# Patient Record
Sex: FEMALE | Race: WHITE | NOT HISPANIC OR LATINO | Employment: UNEMPLOYED | URBAN - METROPOLITAN AREA
[De-identification: names, ages, dates, MRNs, and addresses within clinical notes are randomized per-mention and may not be internally consistent; named-entity substitution may affect disease eponyms.]

---

## 2017-01-09 ENCOUNTER — OFFICE VISIT (OUTPATIENT)
Dept: MEDICAL GROUP | Facility: MEDICAL CENTER | Age: 72
End: 2017-01-09
Payer: MEDICARE

## 2017-01-09 VITALS
DIASTOLIC BLOOD PRESSURE: 80 MMHG | RESPIRATION RATE: 16 BRPM | OXYGEN SATURATION: 91 % | HEIGHT: 60 IN | WEIGHT: 151 LBS | HEART RATE: 89 BPM | SYSTOLIC BLOOD PRESSURE: 112 MMHG | TEMPERATURE: 97.7 F | BODY MASS INDEX: 29.64 KG/M2

## 2017-01-09 DIAGNOSIS — E11.9 CONTROLLED TYPE 2 DIABETES MELLITUS WITHOUT COMPLICATION, WITHOUT LONG-TERM CURRENT USE OF INSULIN (HCC): ICD-10-CM

## 2017-01-09 DIAGNOSIS — M54.50 CHRONIC LOW BACK PAIN WITHOUT SCIATICA, UNSPECIFIED BACK PAIN LATERALITY: ICD-10-CM

## 2017-01-09 DIAGNOSIS — I10 ESSENTIAL HYPERTENSION: ICD-10-CM

## 2017-01-09 DIAGNOSIS — Z00.00 HEALTH CARE MAINTENANCE: ICD-10-CM

## 2017-01-09 DIAGNOSIS — E78.5 HYPERLIPIDEMIA, UNSPECIFIED HYPERLIPIDEMIA TYPE: ICD-10-CM

## 2017-01-09 DIAGNOSIS — F17.200 TOBACCO DEPENDENCE: ICD-10-CM

## 2017-01-09 DIAGNOSIS — G89.29 CHRONIC LOW BACK PAIN WITHOUT SCIATICA, UNSPECIFIED BACK PAIN LATERALITY: ICD-10-CM

## 2017-01-09 DIAGNOSIS — N39.0 URINARY TRACT INFECTION WITHOUT HEMATURIA, SITE UNSPECIFIED: ICD-10-CM

## 2017-01-09 DIAGNOSIS — E03.9 HYPOTHYROIDISM, UNSPECIFIED TYPE: ICD-10-CM

## 2017-01-09 LAB
APPEARANCE UR: CLEAR
BILIRUB UR STRIP-MCNC: NORMAL MG/DL
COLOR UR AUTO: YELLOW
GLUCOSE UR STRIP.AUTO-MCNC: NORMAL MG/DL
HBA1C MFR BLD: 6.4 % (ref ?–5.8)
INT CON NEG: NORMAL
INT CON POS: NORMAL
KETONES UR STRIP.AUTO-MCNC: NORMAL MG/DL
LEUKOCYTE ESTERASE UR QL STRIP.AUTO: NORMAL
NITRITE UR QL STRIP.AUTO: NORMAL
PH UR STRIP.AUTO: 5 [PH] (ref 5–8)
PROT UR QL STRIP: NORMAL MG/DL
RBC UR QL AUTO: NORMAL
SP GR UR STRIP.AUTO: 1.01
UROBILINOGEN UR STRIP-MCNC: NORMAL MG/DL

## 2017-01-09 PROCEDURE — 83036 HEMOGLOBIN GLYCOSYLATED A1C: CPT | Performed by: FAMILY MEDICINE

## 2017-01-09 PROCEDURE — 90732 PPSV23 VACC 2 YRS+ SUBQ/IM: CPT | Performed by: FAMILY MEDICINE

## 2017-01-09 PROCEDURE — 99204 OFFICE O/P NEW MOD 45 MIN: CPT | Mod: 25 | Performed by: FAMILY MEDICINE

## 2017-01-09 PROCEDURE — 81002 URINALYSIS NONAUTO W/O SCOPE: CPT | Performed by: FAMILY MEDICINE

## 2017-01-09 PROCEDURE — G0009 ADMIN PNEUMOCOCCAL VACCINE: HCPCS | Performed by: FAMILY MEDICINE

## 2017-01-09 ASSESSMENT — PATIENT HEALTH QUESTIONNAIRE - PHQ9: CLINICAL INTERPRETATION OF PHQ2 SCORE: 2

## 2017-01-09 NOTE — PROGRESS NOTES
CC: Establish care    HPI:  Alie presents today to establish a new PCP.     Lives alone, active, and independent. Has the following medical issues:    Type 2 diabetes mellitus , has been adequately controlled. Her A1C today is 6.4.Denies excessive urination, and drinking of water. Has been on metformin 1500 mg daily.No side effects.    Patient stated that she has a recent h/o urinary tract infection without hematuria,just wants to check her urine. Denies any burning micturition or gross blood in the urine. UA is checked today, and was found negative for infection and hematuria.    Hypothyroidism, she has been tolerating Levothyroxine, no palpitation, no cold or heat intolerance, she is on levothyroxine 25 mcg daily.No thyroid functions in records.    Hyperlipidemia, she has been tolerating the statin. Denies muscle pain LFTs has been normal, currently on Crestor 20 mg daily.Last lipid panel done in 8/2016 was ( T chol 156, , HDL 45, LDL 76)    Chronic low back pain, she has been following up with pain specialist Dr Jovany Romero.Has been on narcotics, Lamictal and muscle relaxant. Follows up every month.    Smokes a half a pack to a pack a day. Smoking cessation discussed, she does not want to stop for now. Denies cough and SOB.    Essential hypertension, BP has been adequately controlled on current medication. Denies headache, chest pain, and SOB.She is on HCTZ 25 mg daily. No side effects.    Due for PPSV 23.    Patient Active Problem List    Diagnosis Date Noted   • Tobacco dependence 01/09/2017   • Diabetes mellitus type 2 in nonobese (HCC) 06/19/2015   • Chronic low back pain 06/19/2015   • Hyperlipidemia 06/19/2015   • Hypothyroidism 06/19/2015       Current Outpatient Prescriptions   Medication Sig Dispense Refill   • tizanidine (ZANAFLEX) 4 MG TABS Take 4 mg by mouth every 6 hours as needed.     • hydrochlorothiazide (HYDRODIURIL) 25 MG TABS Take 25 mg by mouth every day.     •  Oxycodone-Acetaminophen (PERCOCET-10)  MG TABS Take 1-2 Tabs by mouth every four hours as needed for Severe Pain.     • Cholecalciferol (VITAMIN D) 400 UNIT TABS Take 400 Units by mouth every day.     • lamotrigine (LAMICTAL) 100 MG TABS Take 100 mg by mouth every day.     • levothyroxine (SYNTHROID) 25 mcg/mL SUSP Take  by mouth Every morning on an empty stomach.     • metformin (GLUCOPHAGE) 500 MG TABS Take 500 mg by mouth 2 times a day, with meals.     • rosuvastatin (CRESTOR) 20 MG TABS Take 20 mg by mouth every evening.     • tizanidine (ZANAFLEX) 2 MG tablet Take 1 Tab by mouth 2 times a day as needed. 30 Tab 0     No current facility-administered medications for this visit.         Allergies as of 01/09/2017   • (No Known Allergies)        Social History     Social History   • Marital Status:      Spouse Name: N/A   • Number of Children: N/A   • Years of Education: N/A     Occupational History   • Not on file.     Social History Main Topics   • Smoking status: Current Some Day Smoker -- 0.50 packs/day for 50 years     Types: Cigarettes   • Smokeless tobacco: Never Used   • Alcohol Use: No   • Drug Use: No   • Sexual Activity: Not on file     Other Topics Concern   • Not on file     Social History Narrative   • No narrative on file       History reviewed. No pertinent family history.    Past Surgical History   Procedure Laterality Date   • Abdominal hysterectomy total     • Athroplasty     • Cholecystectomy         ROS:  Denies any Headache, Blurred Vision, Confusion Chest pain,  Shortness of breath,  Abdominal pain, Changes of bowel or bladder, Lower ext edema, Fevers, Nights sweats, Weight Changes, Focal weakness or numbness.  All other systems are negative.    /80 mmHg  Pulse 89  Temp(Src) 36.5 °C (97.7 °F)  Resp 16  Ht 1.524 m (5')  Wt 68.493 kg (151 lb)  BMI 29.49 kg/m2  SpO2 91%    Physical Exam:  Gen:         Alert and oriented, No apparent distress.  HEENT:   [place]  clear,  Oralpharynx no erythema or exudates.  Neck:       No Jugular venous distension, Lymphadenopathy, Thyromegaly, Bruits.  Lungs:     Clear to auscultation bilaterally  CV:          Regular rate and rhythm. No murmurs, rubs or gallops.  Abd:         Soft non tender, non distended. Normal active bowel sounds. No hepatosplenomegaly, No pulsatile masses.  Ext:          No clubbing, cyanosis, edema.      Assessment and Plan.   71 y.o. female     1. Controlled type 2 diabetes mellitus without complication, without long-term current use of insulin (HCC)  Adequately controlled. A1C today is 6.4.  Continue on metformin 1500 mg daily.    - POCT Hemoglobin A1c    2. H/O Urinary tract infection without hematuria, site unspecified  UA is negative for infection and hematuria.    - POCT Urinalysis    3. Hypothyroidism, unspecified type  He has been tolerating Levothyroxine, no palpitation, no cold or heat intolerance  Continue on levothyroxine 25 mcg daily.    - TSH+FREE T4    4. Hyperlipidemia, unspecified hyperlipidemia type  He has been tolerating the statin. Denies muscle pain LFTs has been normal  Continue on Crestor 20 mg daily.    5. Chronic low back pain without sciatica, unspecified back pain laterality  Has been following up with pain specialist Dr Jovany Romero.Has been on narcotics    6. Health care maintenance  Due for PPSV 23.    - PNEUMOCOCCAL POLYSACCHARIDE VACCINE 23-VALENT =>1YO SQ/IM    7. Tobacco dependence  Smokes a half a pack to a pack a day. Smoking cessation discussed, she does not want to stop for now. Denies cough and SOB.    8. Essential hypertension  Has been adequately controlled on current medication. Denies headache, chest pain, and SOB.  Continue on HCTZ 25 mg daily

## 2017-01-09 NOTE — MR AVS SNAPSHOT
Alie Morris   2017 3:40 PM   Office Visit   MRN: [id number]    Department:  [address] [place] [address]   Dept Phone:  [phone]    Description:  Female : 1945   Provider:  Arminda Chin M.D.           Reason for Visit     New Patient establish      Allergies as of 2017     No Known Allergies      You were diagnosed with     Controlled type 2 diabetes mellitus without complication, without long-term current use of insulin (HCC)   [7537092]       Urinary tract infection without hematuria, site unspecified   [0402258]       Hypothyroidism, unspecified type   [1635117]       Hyperlipidemia, unspecified hyperlipidemia type   [8663936]       Chronic low back pain without sciatica, unspecified back pain laterality   [2257993]       Health care maintenance   [456405]       Tobacco dependence   [647680]       Essential hypertension   [7820269]         Vital Signs     Blood Pressure Pulse Temperature Respirations Height Weight    112/80 mmHg 89 36.5 °C (97.7 °F) 16 1.524 m (5') 68.493 kg (151 lb)    Body Mass Index Oxygen Saturation Smoking Status             29.49 kg/m2 91% Current Some Day Smoker         Basic Information     Date Of Birth Sex Race Ethnicity Preferred Language    1945 Female White Non- English      Your appointments     2017  3:40 PM   Established Patient with Arminda Chin M.D.   [address] ([address])    [address] [place] [address] [address] [place] [id number]   [phone]           You will be receiving a confirmation call a few days before your appointment from our automated call confirmation system.              Problem List              ICD-10-CM Priority Class Noted - Resolved    Diabetes mellitus type 2 in nonobese (HCC) E11.9   2015 - Present    Chronic low back pain M54.5, G89.29   2015 - Present    Hyperlipidemia E78.5   2015 - Present    Hypothyroidism E03.9   2015 - Present    Tobacco dependence  F17.200   1/9/2017 - Present      Health Maintenance        Date Due Completion Dates    DIABETES MONOFILAMTENT / LE EXAM 1945 ---    RETINAL SCREENING 3/25/1963 ---    URINE ACR / MICROALBUMIN 3/25/1963 ---    IMM DTaP/Tdap/Td Vaccine (1 - Tdap) 3/25/1964 ---    PAP SMEAR 3/25/1966 ---    MAMMOGRAM 3/25/1985 ---    COLONOSCOPY 3/25/1995 ---    IMM ZOSTER VACCINE 3/25/2005 ---    BONE DENSITY 3/25/2010 ---    IMM PNEUMOCOCCAL 65+ (ADULT) LOW/MEDIUM RISK SERIES (1 of 2 - PCV13) 3/25/2010 ---    IMM INFLUENZA (1) 9/1/2016 ---    A1C SCREENING 7/9/2017 1/9/2017, 8/18/2016    FASTING LIPID PROFILE 8/18/2017 8/18/2016    SERUM CREATININE 8/18/2017 8/18/2016            Results     POCT Hemoglobin A1c      Component    Glycohemoglobin    6.4    Internal Control Negative    Internal Control Positive                POCT Urinalysis      Component Value Standard Range & Units    POC Color yellow Negative    POC Appearance clear Negative    POC Leukocyte Esterase neg Negative    POC Nitrites pink Negative    POC Urobiligen neg Negative (0.2) mg/dL    POC Protein neg Negative mg/dL    POC Urine PH 5.0 5.0 - 8.0    POC Blood neg Negative    POC Specific Gravity 1.010 <1.005 - >1.030    POC Ketones neg Negative mg/dL    POC Biliruben neg Negative mg/dL    POC Glucose neg Negative mg/dL                        Current Immunizations     Pneumococcal polysaccharide vaccine (PPSV-23)  Incomplete      Below and/or attached are the medications your provider expects you to take. Review all of your home medications and newly ordered medications with your provider and/or pharmacist. Follow medication instructions as directed by your provider and/or pharmacist. Please keep your medication list with you and share with your provider. Update the information when medications are discontinued, doses are changed, or new medications (including over-the-counter products) are added; and carry medication information at all times in the event of  emergency situations     Allergies:  No Known Allergies          Medications  Valid as of: January 09, 2017 -  3:55 PM    Generic Name Brand Name Tablet Size Instructions for use    Cholecalciferol (Tab) VITAMIN D 400 UNIT Take 400 Units by mouth every day.        HydroCHLOROthiazide (Tab) HYDRODIURIL 25 MG Take 25 mg by mouth every day.        LamoTRIgine (Tab) LAMICTAL 100 MG Take 100 mg by mouth every day.        levothyroxine (SYNTHROID) 25 mcg/mL SUSP (Suspension) SYNTHROID 25 mcg/mL Take  by mouth Every morning on an empty stomach.        MetFORMIN HCl (Tab) GLUCOPHAGE 500 MG Take 500 mg by mouth 2 times a day, with meals.        Oxycodone-Acetaminophen (Tab) PERCOCET-10  MG Take 1-2 Tabs by mouth every four hours as needed for Severe Pain.        Rosuvastatin Calcium (Tab) CRESTOR 20 MG Take 20 mg by mouth every evening.        TiZANidine HCl (Tab) ZANAFLEX 4 MG Take 4 mg by mouth every 6 hours as needed.        TiZANidine HCl (Tab) ZANAFLEX 2 MG Take 1 Tab by mouth 2 times a day as needed.        .                 Medicines prescribed today were sent to:     [facility] [place] - [address] [facility]    [address] [facility] [address]    Phone: [phone] Fax: [phone]    Open 24 Hours?: No      Medication refill instructions:       If your prescription bottle indicates you have medication refills left, it is not necessary to call your provider’s office. Please contact your pharmacy and they will refill your medication.    If your prescription bottle indicates you do not have any refills left, you may request refills at any time through one of the following ways: The online [employer] system (except Urgent Care), by calling your provider’s office, or by asking your pharmacy to contact your provider’s office with a refill request. Medication refills are processed only during regular business hours and may not be available until the next business day.  Your provider may request additional information or to have a follow-up visit with you prior to refilling your medication.   *Please Note: Medication refills are assigned a new Rx number when refilled electronically. Your pharmacy may indicate that no refills were authorized even though a new prescription for the same medication is available at the pharmacy. Please request the medicine by name with the pharmacy before contacting your provider for a refill.        Instructions      Diabetes Education    • Take your Diabetes medicine as directed, as well as all other prescribed medication, even if you feel good. Tell your provider if you cannot afford your medications or if you are experiencing any side effects.  • Keep simple sugars or glucose tabs with you at all times in the event of low blood sugar. Carry or wear a medical alert card or bracelet/necklace with your diabetic information.   • Check your blood sugar levels daily and according to your providers' recommendations. Keep a log of your blood sugar levels and bring with you to all your appointments. Follow the correctional scale prescribed by your provider, if you were instructed to use rapid acting insulin before meals and before bed. Be sure to account for the carbs in your meal as directed by your provider. Rapid acting insulin: Humulog (lispro), Novolog (aspart), Apidra (glulisine).  • Schedule and keep follow up appointments as indicated by your provider for: diabetes follow ups (bring your glucose meter and blood sugar log), annual dilated eye exam with a qualified ophthalmologist/optometrist, lab tests as requested by your provider (includes blood work and urine test) and semi-annual dental exam.  • Check your feet using a hand held mirror every day for cuts, cracks, sores, bumps, and or red spots. Avoid walking on bare feet. Only use clippers for toenails. See a podiatrist for callous care. Call or schedule an appointment with your provider if you  notice sores that are not healing.  • Eat a small meal or healthy snack every 3 - 4 hours. Don't skip meals. Include protein with carbohydrate at most meals. Drink water, plain or flavored with fresh fruit, instead of juice or soda. An ideal plate is made up of half non-starchy vegetables, one quarter protein and one quarter starch.  • Stay or get to a healthy weight by using your meal plan and moving more. Set a goal to be more active most days of the week. Start slow by taking 10 minute walks 3 times a day. Take the stairs instead of an elevator or escalator. Twice a week, work to increase your muscle strength. Use stretch bands, do yoga, heavy gardening (digging and planting with tools), or use dumb bells to strengthen your arms.  • When to call your Endocrinologist or Primary Care Provider   -If your blood glucose stays over 300 mg/dL for 24 hours    -If you are having frequent low blood sugars    -If you experience any one or all of the following symptoms: blurry    vision, fast breathing, weakness, dizziness or shakiness,     headache, rapid heartbeat, confusion, or irritability.                     [employer] Access Code: [email or web address]  Expires: 1/28/2017 11:19 AM    Epiphany  A secure, online tool to manage your health information     [employer] Epiphany® is a secure, online tool that connects you to your personalized health information from the privacy of your home -- day or night - making it very easy for you to manage your healthcare. Once the activation process is completed, you can even access your medical information using the Epiphany vitaliy, which is available for free in the Apple Vitaliy store or Google Play store.     Epiphany provides the following levels of access (as shown below):   My Chart Features   [facility] Doctor [facility]  Specialists [facility]  Urgent  Care Non-[facility]  Primary Care  Doctor   Email your healthcare team securely and privately 24/7 X X X    Manage appointments: schedule  your next appointment; view details of past/upcoming appointments X      Request prescription refills. X      View recent personal medical records, including lab and immunizations X X X X   View health record, including health history, allergies, medications X X X X   Read reports about your outpatient visits, procedures, consult and ER notes X X X X   See your discharge summary, which is a recap of your hospital and/or ER visit that includes your diagnosis, lab results, and care plan. X X       How to register for [employer]:  1. Go to  [email or web address].  2. Click on the Sign Up Now box, which takes you to the New Member Sign Up page. You will need to provide the following information:  a. Enter your [employer] Access Code exactly as it appears at the top of this page. (You will not need to use this code after you’ve completed the sign-up process. If you do not sign up before the expiration date, you must request a new code.)   b. Enter your date of birth.   c. Enter your home email address.   d. Click Submit, and follow the next screen’s instructions.  3. Create a [employer] ID. This will be your [employer] login ID and cannot be changed, so think of one that is secure and easy to remember.  4. Create a [employer] password. You can change your password at any time.  5. Enter your Password Reset Question and Answer. This can be used at a later time if you forget your password.   6. Enter your e-mail address. This allows you to receive e-mail notifications when new information is available in [employer].  7. Click Sign Up. You can now view your health information.    For assistance activating your [employer] account, call [phone]

## 2017-04-11 ENCOUNTER — APPOINTMENT (OUTPATIENT)
Dept: MEDICAL GROUP | Facility: MEDICAL CENTER | Age: 72
End: 2017-04-11
Payer: MEDICAID

## 2017-04-12 RX ORDER — LAMOTRIGINE 100 MG/1
100 TABLET ORAL DAILY
Qty: 90 TAB | Refills: 1 | Status: SHIPPED | OUTPATIENT
Start: 2017-04-12 | End: 2017-10-12 | Stop reason: SDUPTHER

## 2017-04-12 RX ORDER — ROSUVASTATIN CALCIUM 20 MG/1
20 TABLET, COATED ORAL EVERY EVENING
Qty: 90 TAB | Refills: 1 | Status: SHIPPED | OUTPATIENT
Start: 2017-04-12 | End: 2017-09-28 | Stop reason: SDUPTHER

## 2017-04-12 RX ORDER — HYDROCHLOROTHIAZIDE 25 MG/1
25 TABLET ORAL DAILY
Qty: 90 TAB | Refills: 1 | Status: SHIPPED | OUTPATIENT
Start: 2017-04-12 | End: 2017-05-25

## 2017-04-12 RX ORDER — TIZANIDINE 4 MG/1
4 TABLET ORAL EVERY 6 HOURS PRN
Qty: 30 TAB | Refills: 2 | Status: SHIPPED | OUTPATIENT
Start: 2017-04-12 | End: 2017-04-15

## 2017-04-12 NOTE — TELEPHONE ENCOUNTER
Patient rescheduled her appt for 4/18 she missed her appt. Yesterday due to her flight not leaving on time. She is out of all her medications

## 2017-04-15 ENCOUNTER — APPOINTMENT (OUTPATIENT)
Dept: RADIOLOGY | Facility: MEDICAL CENTER | Age: 72
DRG: 871 | End: 2017-04-15
Attending: EMERGENCY MEDICINE
Payer: MEDICARE

## 2017-04-15 ENCOUNTER — APPOINTMENT (OUTPATIENT)
Dept: RADIOLOGY | Facility: MEDICAL CENTER | Age: 72
DRG: 871 | End: 2017-04-15
Attending: HOSPITALIST
Payer: MEDICARE

## 2017-04-15 ENCOUNTER — RESOLUTE PROFESSIONAL BILLING HOSPITAL PROF FEE (OUTPATIENT)
Dept: HOSPITALIST | Facility: MEDICAL CENTER | Age: 72
End: 2017-04-15
Payer: MEDICARE

## 2017-04-15 ENCOUNTER — HOSPITAL ENCOUNTER (INPATIENT)
Facility: MEDICAL CENTER | Age: 72
LOS: 4 days | DRG: 871 | End: 2017-04-19
Attending: EMERGENCY MEDICINE | Admitting: INTERNAL MEDICINE
Payer: MEDICARE

## 2017-04-15 DIAGNOSIS — A41.9 SEPSIS, DUE TO UNSPECIFIED ORGANISM: ICD-10-CM

## 2017-04-15 DIAGNOSIS — T40.604A NARCOTIC OVERDOSE, UNDETERMINED INTENT, INITIAL ENCOUNTER (HCC): ICD-10-CM

## 2017-04-15 DIAGNOSIS — N39.0 URINARY TRACT INFECTION, SITE UNSPECIFIED: ICD-10-CM

## 2017-04-15 DIAGNOSIS — M54.50 MIDLINE LOW BACK PAIN WITHOUT SCIATICA, UNSPECIFIED CHRONICITY: ICD-10-CM

## 2017-04-15 PROBLEM — R65.21 SEPTIC SHOCK (HCC): Status: ACTIVE | Noted: 2017-04-15

## 2017-04-15 LAB
ALBUMIN SERPL BCP-MCNC: 3.8 G/DL (ref 3.2–4.9)
ALBUMIN/GLOB SERPL: 1.7 G/DL
ALP SERPL-CCNC: 66 U/L (ref 30–99)
ALT SERPL-CCNC: 120 U/L (ref 2–50)
AMPHET UR QL SCN: NEGATIVE
ANION GAP SERPL CALC-SCNC: 14 MMOL/L (ref 0–11.9)
APPEARANCE UR: ABNORMAL
AST SERPL-CCNC: 244 U/L (ref 12–45)
BACTERIA #/AREA URNS HPF: ABNORMAL /HPF
BARBITURATES UR QL SCN: NEGATIVE
BASOPHILS # BLD AUTO: 0.2 % (ref 0–1.8)
BASOPHILS # BLD: 0.02 K/UL (ref 0–0.12)
BENZODIAZ UR QL SCN: NEGATIVE
BILIRUB SERPL-MCNC: 0.5 MG/DL (ref 0.1–1.5)
BILIRUB UR QL STRIP.AUTO: NEGATIVE
BUN SERPL-MCNC: 44 MG/DL (ref 8–22)
BZE UR QL SCN: NEGATIVE
CALCIUM SERPL-MCNC: 8.5 MG/DL (ref 8.5–10.5)
CANNABINOIDS UR QL SCN: NEGATIVE
CHLORIDE SERPL-SCNC: 94 MMOL/L (ref 96–112)
CO2 SERPL-SCNC: 20 MMOL/L (ref 20–33)
COLOR UR: YELLOW
CORTIS SERPL-MCNC: 16.8 UG/DL (ref 0–23)
CREAT SERPL-MCNC: 2.78 MG/DL (ref 0.5–1.4)
EKG IMPRESSION: NORMAL
EOSINOPHIL # BLD AUTO: 0.16 K/UL (ref 0–0.51)
EOSINOPHIL NFR BLD: 1.9 % (ref 0–6.9)
EPI CELLS #/AREA URNS HPF: ABNORMAL /HPF
ERYTHROCYTE [DISTWIDTH] IN BLOOD BY AUTOMATED COUNT: 53.5 FL (ref 35.9–50)
GFR SERPL CREATININE-BSD FRML MDRD: 17 ML/MIN/1.73 M 2
GLOBULIN SER CALC-MCNC: 2.2 G/DL (ref 1.9–3.5)
GLUCOSE BLD-MCNC: 102 MG/DL (ref 65–99)
GLUCOSE BLD-MCNC: 123 MG/DL (ref 65–99)
GLUCOSE SERPL-MCNC: 83 MG/DL (ref 65–99)
GLUCOSE UR STRIP.AUTO-MCNC: NEGATIVE MG/DL
HCT VFR BLD AUTO: 34.2 % (ref 37–47)
HGB BLD-MCNC: 10.7 G/DL (ref 12–16)
IMM GRANULOCYTES # BLD AUTO: 0.03 K/UL (ref 0–0.11)
IMM GRANULOCYTES NFR BLD AUTO: 0.4 % (ref 0–0.9)
KETONES UR STRIP.AUTO-MCNC: NEGATIVE MG/DL
LACTATE BLD-SCNC: 1.1 MMOL/L (ref 0.5–2)
LACTATE BLD-SCNC: 1.9 MMOL/L (ref 0.5–2)
LACTATE BLD-SCNC: 6.3 MMOL/L (ref 0.5–2)
LEUKOCYTE ESTERASE UR QL STRIP.AUTO: ABNORMAL
LYMPHOCYTES # BLD AUTO: 1.67 K/UL (ref 1–4.8)
LYMPHOCYTES NFR BLD: 20.3 % (ref 22–41)
MCH RBC QN AUTO: 33.3 PG (ref 27–33)
MCHC RBC AUTO-ENTMCNC: 31.3 G/DL (ref 33.6–35)
MCV RBC AUTO: 106.5 FL (ref 81.4–97.8)
MDMA UR QL SCN: NEGATIVE
METHADONE UR QL SCN: NEGATIVE
MICRO URNS: ABNORMAL
MONOCYTES # BLD AUTO: 0.67 K/UL (ref 0–0.85)
MONOCYTES NFR BLD AUTO: 8.1 % (ref 0–13.4)
NEUTROPHILS # BLD AUTO: 5.68 K/UL (ref 2–7.15)
NEUTROPHILS NFR BLD: 69.1 % (ref 44–72)
NITRITE UR QL STRIP.AUTO: NEGATIVE
NRBC # BLD AUTO: 0 K/UL
NRBC BLD AUTO-RTO: 0 /100 WBC
OPIATES UR QL SCN: POSITIVE
OXYCODONE UR QL SCN: POSITIVE
PCP UR QL SCN: NEGATIVE
PH UR STRIP.AUTO: 5.5 [PH]
PLATELET # BLD AUTO: 142 K/UL (ref 164–446)
PMV BLD AUTO: 10.1 FL (ref 9–12.9)
POTASSIUM SERPL-SCNC: 5 MMOL/L (ref 3.6–5.5)
PROPOXYPH UR QL SCN: NEGATIVE
PROT SERPL-MCNC: 6 G/DL (ref 6–8.2)
PROT UR QL STRIP: 100 MG/DL
RBC # BLD AUTO: 3.21 M/UL (ref 4.2–5.4)
RBC # URNS HPF: ABNORMAL /HPF
RBC UR QL AUTO: ABNORMAL
SODIUM SERPL-SCNC: 128 MMOL/L (ref 135–145)
SP GR UR STRIP.AUTO: 1.02
T4 FREE SERPL-MCNC: 0.75 NG/DL (ref 0.53–1.43)
TROPONIN I SERPL-MCNC: 0.01 NG/ML (ref 0–0.04)
TSH SERPL DL<=0.005 MIU/L-ACNC: 9.92 UIU/ML (ref 0.3–3.7)
WBC # BLD AUTO: 8.2 K/UL (ref 4.8–10.8)
WBC #/AREA URNS HPF: >150 /HPF

## 2017-04-15 PROCEDURE — 96365 THER/PROPH/DIAG IV INF INIT: CPT

## 2017-04-15 PROCEDURE — 700111 HCHG RX REV CODE 636 W/ 250 OVERRIDE (IP): Performed by: EMERGENCY MEDICINE

## 2017-04-15 PROCEDURE — 700105 HCHG RX REV CODE 258: Performed by: EMERGENCY MEDICINE

## 2017-04-15 PROCEDURE — 96375 TX/PRO/DX INJ NEW DRUG ADDON: CPT

## 2017-04-15 PROCEDURE — 99223 1ST HOSP IP/OBS HIGH 75: CPT | Mod: 25 | Performed by: INTERNAL MEDICINE

## 2017-04-15 PROCEDURE — 770022 HCHG ROOM/CARE - ICU (200)

## 2017-04-15 PROCEDURE — 303105 HCHG CATHETER EXTRA

## 2017-04-15 PROCEDURE — 700111 HCHG RX REV CODE 636 W/ 250 OVERRIDE (IP)

## 2017-04-15 PROCEDURE — B244ZZZ ULTRASONOGRAPHY OF RIGHT HEART: ICD-10-PCS | Performed by: HOSPITALIST

## 2017-04-15 PROCEDURE — 36556 INSERT NON-TUNNEL CV CATH: CPT | Performed by: HOSPITALIST

## 2017-04-15 PROCEDURE — 36556 INSERT NON-TUNNEL CV CATH: CPT

## 2017-04-15 PROCEDURE — 83605 ASSAY OF LACTIC ACID: CPT | Mod: 91

## 2017-04-15 PROCEDURE — 87077 CULTURE AEROBIC IDENTIFY: CPT

## 2017-04-15 PROCEDURE — 93005 ELECTROCARDIOGRAM TRACING: CPT | Performed by: EMERGENCY MEDICINE

## 2017-04-15 PROCEDURE — 700111 HCHG RX REV CODE 636 W/ 250 OVERRIDE (IP): Performed by: HOSPITALIST

## 2017-04-15 PROCEDURE — 700111 HCHG RX REV CODE 636 W/ 250 OVERRIDE (IP): Performed by: INTERNAL MEDICINE

## 2017-04-15 PROCEDURE — 71010 DX-CHEST-PORTABLE (1 VIEW): CPT

## 2017-04-15 PROCEDURE — 84484 ASSAY OF TROPONIN QUANT: CPT

## 2017-04-15 PROCEDURE — 700105 HCHG RX REV CODE 258

## 2017-04-15 PROCEDURE — 51702 INSERT TEMP BLADDER CATH: CPT

## 2017-04-15 PROCEDURE — 80307 DRUG TEST PRSMV CHEM ANLYZR: CPT

## 2017-04-15 PROCEDURE — 700105 HCHG RX REV CODE 258: Performed by: INTERNAL MEDICINE

## 2017-04-15 PROCEDURE — 82533 TOTAL CORTISOL: CPT

## 2017-04-15 PROCEDURE — 80053 COMPREHEN METABOLIC PANEL: CPT

## 2017-04-15 PROCEDURE — 96372 THER/PROPH/DIAG INJ SC/IM: CPT

## 2017-04-15 PROCEDURE — 700102 HCHG RX REV CODE 250 W/ 637 OVERRIDE(OP): Performed by: INTERNAL MEDICINE

## 2017-04-15 PROCEDURE — 87040 BLOOD CULTURE FOR BACTERIA: CPT | Mod: 91

## 2017-04-15 PROCEDURE — 81001 URINALYSIS AUTO W/SCOPE: CPT

## 2017-04-15 PROCEDURE — 99291 CRITICAL CARE FIRST HOUR: CPT

## 2017-04-15 PROCEDURE — A9270 NON-COVERED ITEM OR SERVICE: HCPCS | Performed by: INTERNAL MEDICINE

## 2017-04-15 PROCEDURE — C1751 CATH, INF, PER/CENT/MIDLINE: HCPCS

## 2017-04-15 PROCEDURE — 76937 US GUIDE VASCULAR ACCESS: CPT

## 2017-04-15 PROCEDURE — 85025 COMPLETE CBC W/AUTO DIFF WBC: CPT

## 2017-04-15 PROCEDURE — 84439 ASSAY OF FREE THYROXINE: CPT

## 2017-04-15 PROCEDURE — 700101 HCHG RX REV CODE 250: Performed by: EMERGENCY MEDICINE

## 2017-04-15 PROCEDURE — 96366 THER/PROPH/DIAG IV INF ADDON: CPT

## 2017-04-15 PROCEDURE — 76775 US EXAM ABDO BACK WALL LIM: CPT

## 2017-04-15 PROCEDURE — 87086 URINE CULTURE/COLONY COUNT: CPT

## 2017-04-15 PROCEDURE — 96361 HYDRATE IV INFUSION ADD-ON: CPT

## 2017-04-15 PROCEDURE — 82962 GLUCOSE BLOOD TEST: CPT | Mod: 91

## 2017-04-15 PROCEDURE — 96368 THER/DIAG CONCURRENT INF: CPT

## 2017-04-15 PROCEDURE — 87186 SC STD MICRODIL/AGAR DIL: CPT

## 2017-04-15 PROCEDURE — 02H633Z INSERTION OF INFUSION DEVICE INTO RIGHT ATRIUM, PERCUTANEOUS APPROACH: ICD-10-PCS | Performed by: HOSPITALIST

## 2017-04-15 PROCEDURE — 84443 ASSAY THYROID STIM HORMONE: CPT

## 2017-04-15 RX ORDER — TIZANIDINE 4 MG/1
4 TABLET ORAL 2 TIMES DAILY
Status: DISCONTINUED | OUTPATIENT
Start: 2017-04-15 | End: 2017-04-20 | Stop reason: HOSPADM

## 2017-04-15 RX ORDER — SODIUM CHLORIDE 9 MG/ML
500 INJECTION, SOLUTION INTRAVENOUS
Status: DISCONTINUED | OUTPATIENT
Start: 2017-04-15 | End: 2017-04-16

## 2017-04-15 RX ORDER — LEVOTHYROXINE SODIUM 0.03 MG/1
25 TABLET ORAL DAILY
Status: ON HOLD | COMMUNITY
End: 2017-04-17

## 2017-04-15 RX ORDER — NOREPINEPHRINE BITARTRATE 1 MG/ML
INJECTION, SOLUTION INTRAVENOUS
Status: DISCONTINUED
Start: 2017-04-15 | End: 2017-04-15

## 2017-04-15 RX ORDER — SODIUM CHLORIDE 9 MG/ML
30 INJECTION, SOLUTION INTRAVENOUS ONCE
Status: COMPLETED | OUTPATIENT
Start: 2017-04-15 | End: 2017-04-15

## 2017-04-15 RX ORDER — ONDANSETRON 2 MG/ML
4 INJECTION INTRAMUSCULAR; INTRAVENOUS EVERY 4 HOURS PRN
Status: DISCONTINUED | OUTPATIENT
Start: 2017-04-15 | End: 2017-04-20 | Stop reason: HOSPADM

## 2017-04-15 RX ORDER — FAMOTIDINE 20 MG/1
20 TABLET, FILM COATED ORAL DAILY
Status: DISCONTINUED | OUTPATIENT
Start: 2017-04-15 | End: 2017-04-17

## 2017-04-15 RX ORDER — NALOXONE HYDROCHLORIDE 0.4 MG/ML
INJECTION, SOLUTION INTRAMUSCULAR; INTRAVENOUS; SUBCUTANEOUS
Status: COMPLETED
Start: 2017-04-15 | End: 2017-04-15

## 2017-04-15 RX ORDER — BISACODYL 10 MG
10 SUPPOSITORY, RECTAL RECTAL
Status: DISCONTINUED | OUTPATIENT
Start: 2017-04-15 | End: 2017-04-20 | Stop reason: HOSPADM

## 2017-04-15 RX ORDER — SODIUM CHLORIDE 9 MG/ML
2000 INJECTION, SOLUTION INTRAVENOUS ONCE
Status: COMPLETED | OUTPATIENT
Start: 2017-04-15 | End: 2017-04-15

## 2017-04-15 RX ORDER — LAMOTRIGINE 100 MG/1
100 TABLET ORAL DAILY
Status: DISCONTINUED | OUTPATIENT
Start: 2017-04-15 | End: 2017-04-20 | Stop reason: HOSPADM

## 2017-04-15 RX ORDER — SODIUM CHLORIDE 9 MG/ML
INJECTION, SOLUTION INTRAVENOUS CONTINUOUS
Status: DISCONTINUED | OUTPATIENT
Start: 2017-04-15 | End: 2017-04-17

## 2017-04-15 RX ORDER — ROSUVASTATIN CALCIUM 10 MG/1
20 TABLET, COATED ORAL DAILY
Status: DISCONTINUED | OUTPATIENT
Start: 2017-04-16 | End: 2017-04-20 | Stop reason: HOSPADM

## 2017-04-15 RX ORDER — HEPARIN SODIUM 5000 [USP'U]/ML
5000 INJECTION, SOLUTION INTRAVENOUS; SUBCUTANEOUS EVERY 8 HOURS
Status: DISCONTINUED | OUTPATIENT
Start: 2017-04-15 | End: 2017-04-20 | Stop reason: HOSPADM

## 2017-04-15 RX ORDER — POLYETHYLENE GLYCOL 3350 17 G/17G
1 POWDER, FOR SOLUTION ORAL
Status: DISCONTINUED | OUTPATIENT
Start: 2017-04-15 | End: 2017-04-20 | Stop reason: HOSPADM

## 2017-04-15 RX ORDER — DEXTROSE MONOHYDRATE 25 G/50ML
25 INJECTION, SOLUTION INTRAVENOUS
Status: DISCONTINUED | OUTPATIENT
Start: 2017-04-15 | End: 2017-04-20 | Stop reason: HOSPADM

## 2017-04-15 RX ORDER — TIZANIDINE 4 MG/1
4 TABLET ORAL EVERY 6 HOURS PRN
COMMUNITY
End: 2017-05-25

## 2017-04-15 RX ORDER — ONDANSETRON 4 MG/1
4 TABLET, ORALLY DISINTEGRATING ORAL EVERY 4 HOURS PRN
Status: DISCONTINUED | OUTPATIENT
Start: 2017-04-15 | End: 2017-04-20 | Stop reason: HOSPADM

## 2017-04-15 RX ORDER — LEVOTHYROXINE SODIUM 0.05 MG/1
25 TABLET ORAL
Status: DISCONTINUED | OUTPATIENT
Start: 2017-04-16 | End: 2017-04-20 | Stop reason: HOSPADM

## 2017-04-15 RX ORDER — AMOXICILLIN 250 MG
2 CAPSULE ORAL 2 TIMES DAILY
Status: DISCONTINUED | OUTPATIENT
Start: 2017-04-15 | End: 2017-04-20 | Stop reason: HOSPADM

## 2017-04-15 RX ORDER — ACETAMINOPHEN 325 MG/1
650 TABLET ORAL EVERY 6 HOURS PRN
Status: DISCONTINUED | OUTPATIENT
Start: 2017-04-15 | End: 2017-04-20 | Stop reason: HOSPADM

## 2017-04-15 RX ORDER — SODIUM CHLORIDE 9 MG/ML
INJECTION, SOLUTION INTRAVENOUS
Status: COMPLETED
Start: 2017-04-15 | End: 2017-04-15

## 2017-04-15 RX ADMIN — PIPERACILLIN AND TAZOBACTAM 2.25 G: 3; .375 INJECTION, POWDER, LYOPHILIZED, FOR SOLUTION INTRAVENOUS; PARENTERAL at 18:01

## 2017-04-15 RX ADMIN — PIPERACILLIN AND TAZOBACTAM 4.5 G: 4; .5 INJECTION, POWDER, LYOPHILIZED, FOR SOLUTION INTRAVENOUS; PARENTERAL at 12:35

## 2017-04-15 RX ADMIN — FENTANYL CITRATE 100 MCG: 50 INJECTION INTRAMUSCULAR; INTRAVENOUS at 17:26

## 2017-04-15 RX ADMIN — HEPARIN SODIUM 5000 UNITS: 5000 INJECTION, SOLUTION INTRAVENOUS; SUBCUTANEOUS at 21:11

## 2017-04-15 RX ADMIN — HEPARIN SODIUM 5000 UNITS: 5000 INJECTION, SOLUTION INTRAVENOUS; SUBCUTANEOUS at 14:41

## 2017-04-15 RX ADMIN — LAMOTRIGINE 100 MG: 100 TABLET ORAL at 22:11

## 2017-04-15 RX ADMIN — FAMOTIDINE 20 MG: 10 INJECTION, SOLUTION INTRAVENOUS at 14:39

## 2017-04-15 RX ADMIN — SODIUM CHLORIDE 1824 ML: 9 INJECTION, SOLUTION INTRAVENOUS at 11:20

## 2017-04-15 RX ADMIN — TIZANIDINE 4 MG: 4 TABLET ORAL at 22:11

## 2017-04-15 RX ADMIN — SODIUM CHLORIDE 4000 ML: 9 INJECTION, SOLUTION INTRAVENOUS at 13:30

## 2017-04-15 RX ADMIN — ACETAMINOPHEN 650 MG: 325 TABLET, FILM COATED ORAL at 21:10

## 2017-04-15 RX ADMIN — SODIUM CHLORIDE 500 ML: 9 INJECTION, SOLUTION INTRAVENOUS at 18:03

## 2017-04-15 RX ADMIN — NOREPINEPHRINE BITARTRATE 5 MCG/MIN: 1 INJECTION INTRAVENOUS at 12:30

## 2017-04-15 RX ADMIN — SODIUM CHLORIDE: 9 INJECTION, SOLUTION INTRAVENOUS at 14:50

## 2017-04-15 RX ADMIN — STANDARDIZED SENNA CONCENTRATE AND DOCUSATE SODIUM 2 TABLET: 8.6; 5 TABLET, FILM COATED ORAL at 21:10

## 2017-04-15 RX ADMIN — NALOXONE HYDROCHLORIDE 0.2 MG: 0.4 INJECTION, SOLUTION INTRAMUSCULAR; INTRAVENOUS; SUBCUTANEOUS at 12:30

## 2017-04-15 ASSESSMENT — COPD QUESTIONNAIRES
HAVE YOU SMOKED AT LEAST 100 CIGARETTES IN YOUR ENTIRE LIFE: YES
COPD SCREENING SCORE: 5
DURING THE PAST 4 WEEKS HOW MUCH DID YOU FEEL SHORT OF BREATH: SOME OF THE TIME
DURING THE PAST 4 WEEKS HOW MUCH DID YOU FEEL SHORT OF BREATH: SOME OF THE TIME
COPD SCREENING SCORE: 5
DO YOU EVER COUGH UP ANY MUCUS OR PHLEGM?: NO/ONLY WITH OCCASIONAL COLDS OR INFECTIONS
DO YOU EVER COUGH UP ANY MUCUS OR PHLEGM?: NO/ONLY WITH OCCASIONAL COLDS OR INFECTIONS
HAVE YOU SMOKED AT LEAST 100 CIGARETTES IN YOUR ENTIRE LIFE: YES

## 2017-04-15 ASSESSMENT — PAIN SCALES - GENERAL
PAINLEVEL_OUTOF10: 6
PAINLEVEL_OUTOF10: 5
PAINLEVEL_OUTOF10: 7
PAINLEVEL_OUTOF10: 7
PAINLEVEL_OUTOF10: 4

## 2017-04-15 ASSESSMENT — PATIENT HEALTH QUESTIONNAIRE - PHQ9
1. LITTLE INTEREST OR PLEASURE IN DOING THINGS: NOT AT ALL
SUM OF ALL RESPONSES TO PHQ9 QUESTIONS 1 AND 2: 0
SUM OF ALL RESPONSES TO PHQ QUESTIONS 1-9: 0

## 2017-04-15 ASSESSMENT — LIFESTYLE VARIABLES
DO YOU DRINK ALCOHOL: NO
EVER_SMOKED: YES
ALCOHOL_USE: NO

## 2017-04-15 NOTE — ED NOTES
ELIZABETH FERNANDEZ from home, roommate called because patient was ' falling down more', patient is very lethargic, told [facility] she took 2 of her vicodin 10mg/325mg, hypotensive 57/35, pupils 1 mm, forrest, #20 L ac estab pta labs drawn, ekg done

## 2017-04-15 NOTE — ED PROVIDER NOTES
"ED Provider Note    CHIEF COMPLAINT  Chief Complaint   Patient presents with   • ALOC       HPI  Alie Morris is a 72 y.o. female who presents altered mental status, having been administered Narcan by paramedics for pinpoint pupils and altered mental status in the field.  Pupils are now 3 mm, patient's abnormal one-word replies, however according to the nursing staff is more awake at this time and upon her initial arrival.  Patient complains of low back pain.  Patient initially seen by previous physician who had ordered emergent ultrasound to rule out abdominal aortic aneurysm.  Patient is hypotensive upon arrival at 79 systolic.  History very difficult secondary to the patient's somnolence.  She does indicate negative when asked about chest pain or shortness of breath or cough.  Asked where it hurts she states \"my back\".  Patient did not answer the question about dysuria.  She did not answer the question about new skin change redness or swelling.    REVIEW OF SYSTEMS    Review of systems impossible secondary to the patient's condition    PAST MEDICAL HISTORY  Past Medical History   Diagnosis Date   • Hypertension    • Diabetes (CMS-HCC)    • Hyperlipidemia    • Thyroid disease    • Cancer ([facility])        FAMILY HISTORY  No family history on file.    SOCIAL HISTORY  Social History     Social History   • Marital Status:      Spouse Name: N/A   • Number of Children: N/A   • Years of Education: N/A     Social History Main Topics   • Smoking status: Current Some Day Smoker -- 0.50 packs/day for 50 years     Types: Cigarettes   • Smokeless tobacco: Never Used   • Alcohol Use: No   • Drug Use: No   • Sexual Activity: Not on file     Other Topics Concern   • Not on file     Social History Narrative   • No narrative on file       SURGICAL HISTORY  Past Surgical History   Procedure Laterality Date   • Abdominal hysterectomy total     • Athroplasty     • Cholecystectomy         CURRENT MEDICATIONS  Home " Medications     Reviewed by Neela Knight, Pharmacy Int (Pharmacy Intern) on 04/15/17 at 1220  Med List Status: Complete    Medication Last Dose Status    hydrochlorothiazide (HYDRODIURIL) 25 MG Tab 4/15/2017 Active    lamotrigine (LAMICTAL) 100 MG Tab 4/14/2017 Active    levothyroxine (SYNTHROID) 25 MCG Tab 4/15/2017 Active    metformin (GLUCOPHAGE) 500 MG Tab 4/15/2017 Active    Oxycodone-Acetaminophen (PERCOCET-10)  MG TABS 4/15/2017 Active    rosuvastatin (CRESTOR) 20 MG Tab 4/15/2017 Active    tizanidine (ZANAFLEX) 4 MG Tab 4/15/2017 Active                ALLERGIES  Allergies   Allergen Reactions   • Ibuprofen Rash     Rash   • Penicillins Rash     Rash  Tolerated Zosyn 4/15/2017       PHYSICAL EXAM  VITAL SIGNS: Pulse 96  Resp 30  Ht 1.524 m (5')  Wt 60.782 kg (134 lb)  BMI 26.17 kg/m2  SpO2 100%  Constitutional: Ill appearance.   HENT: No facial swelling, mucous membranes dry  Eyes: Anicteric, no conjunctivitis.   Pupils 3 mm bilateral  Cardiovascular: Normal heart rate, Normal rhythm  Pulmonary:  No wheezing, No crackles  Gastrointestinal: Soft, No tenderness, No masses, no distention  Skin: Warm, Dry, No cyanosis.  No petechiae  Neurologic: Speech is mumbled, follows commands, facial expression is symmetrical.   strength is equally weak, also corresponds to weak bilateral leg strength.  Suspect lack of effort to the exam  Vascular: Palpable pulses all 4 extremities.   Musculoskeletal: Low back tenderness midline.  Flanks nontender.    EKG/Labs  Results for orders placed or performed during the hospital encounter of 04/15/17   CBC WITH DIFFERENTIAL   Result Value Ref Range    WBC 8.2 4.8 - 10.8 K/uL    RBC 3.21 (L) 4.20 - 5.40 M/uL    Hemoglobin 10.7 (L) 12.0 - 16.0 g/dL    Hematocrit 34.2 (L) 37.0 - 47.0 %    .5 (H) 81.4 - 97.8 fL    MCH 33.3 (H) 27.0 - 33.0 pg    MCHC 31.3 (L) 33.6 - 35.0 g/dL    RDW 53.5 (H) 35.9 - 50.0 fL    Platelet Count 142 (L) 164 - 446 K/uL    MPV 10.1 9.0 -  12.9 fL    Neutrophils-Polys 69.10 44.00 - 72.00 %    Lymphocytes 20.30 (L) 22.00 - 41.00 %    Monocytes 8.10 0.00 - 13.40 %    Eosinophils 1.90 0.00 - 6.90 %    Basophils 0.20 0.00 - 1.80 %    Immature Granulocytes 0.40 0.00 - 0.90 %    Nucleated RBC 0.00 /100 WBC    Neutrophils (Absolute) 5.68 2.00 - 7.15 K/uL    Lymphs (Absolute) 1.67 1.00 - 4.80 K/uL    Monos (Absolute) 0.67 0.00 - 0.85 K/uL    Eos (Absolute) 0.16 0.00 - 0.51 K/uL    Baso (Absolute) 0.02 0.00 - 0.12 K/uL    Immature Granulocytes (abs) 0.03 0.00 - 0.11 K/uL    NRBC (Absolute) 0.00 K/uL   COMP METABOLIC PANEL   Result Value Ref Range    Sodium 128 (L) 135 - 145 mmol/L    Potassium 5.0 3.6 - 5.5 mmol/L    Chloride 94 (L) 96 - 112 mmol/L    Co2 20 20 - 33 mmol/L    Anion Gap 14.0 (H) 0.0 - 11.9    Glucose 83 65 - 99 mg/dL    Bun 44 (H) 8 - 22 mg/dL    Creatinine 2.78 (H) 0.50 - 1.40 mg/dL    Calcium 8.5 8.5 - 10.5 mg/dL    AST(SGOT) 244 (H) 12 - 45 U/L    ALT(SGPT) 120 (H) 2 - 50 U/L    Alkaline Phosphatase 66 30 - 99 U/L    Total Bilirubin 0.5 0.1 - 1.5 mg/dL    Albumin 3.8 3.2 - 4.9 g/dL    Total Protein 6.0 6.0 - 8.2 g/dL    Globulin 2.2 1.9 - 3.5 g/dL    A-G Ratio 1.7 g/dL   LACTIC ACID   Result Value Ref Range    Lactic Acid 6.3 (HH) 0.5 - 2.0 mmol/L   URINALYSIS   Result Value Ref Range    Micro Urine Req Microscopic     Color Yellow     Character Cloudy (A)     Specific Gravity 1.022 <1.035    Ph 5.5 5.0-8.0    Glucose Negative Negative mg/dL    Ketones Negative Negative mg/dL    Protein 100 (A) Negative mg/dL    Bilirubin Negative Negative    Nitrite Negative Negative    Leukocyte Esterase Large (A) Negative    Occult Blood Moderate (A) Negative   TROPONIN   Result Value Ref Range    Troponin I 0.01 0.00 - 0.04 ng/mL   CORTISOL   Result Value Ref Range    Cortisol 16.8 0.0 - 23.0 ug/dL   URINE DRUG SCREEN   Result Value Ref Range    Amphetamines Urine Negative Negative    Barbiturates Negative Negative    Benzodiazepines Negative Negative     Cocaine Metabolite Negative Negative    Methadone Negative Negative    Ecstasy Negative Negative    Phencyclidine -Pcp Negative Negative    Propoxyphene Negative Negative    Opiates Positive (A) Negative    Oxycodone Positive (A) Negative    Cannabinoid Metab Negative Negative   URINE MICROSCOPIC (W/UA)   Result Value Ref Range    WBC >150 (A) /hpf    RBC  (A) /hpf    Bacteria Many (A) None /hpf    Epithelial Cells Few /hpf   ESTIMATED GFR   Result Value Ref Range    GFR If  20 (A) >60 mL/min/1.73 m 2    GFR If Non African American 17 (A) >60 mL/min/1.73 m 2   Lactic Acid -STAT Once   Result Value Ref Range    Lactic Acid 1.9 0.5 - 2.0 mmol/L   TSH WITH REFLEX TO FT4   Result Value Ref Range    TSH 9.920 (H) 0.300 - 3.700 uIU/mL   EKG   Result Value Ref Range    Report       [facility] Emergency Dept.    Test Date:  2017-04-15  Pt Name:    JORGE CHÁVEZ              Department: ER  MRN:        [id number]                      Room:       [facility]  Gender:     F                            Technician: [id number]  :        1945                   Requested By:JOLLY SIMON  Order #:    [id number]                    Reading MD: JOLLY SIMON MD    Measurements  Intervals                                Axis  Rate:       81                           P:          13  IA:         148                          QRS:        17  QRSD:       80                           T:          13  QT:         420  QTc:        488    Interpretive Statements  SINUS RHYTHM  LOW VOLTAGE THROUGHOUT  BORDERLINE PROLONGED QT INTERVAL  No previous ECG available for comparison    Electronically Signed On 4- 14:39:05 PDT by JOLLY SIMON MD           RADIOLOGY/PROCEDURES  US-AORTA   Final Result      Normal abdominal aortic ultrasound for age.      DX-CHEST-PORTABLE (1 VIEW)   Final Result      Bibasilar atelectasis and cardiac silhouette enlargement. Edema not entirely excluded     "        COURSE & MEDICAL DECISION MAKING  Pertinent Labs & Imaging studies reviewed. (See chart for details)  Patient with urinary tract infection as source of sepsis.  She has severe sepsis given hypotension, elevated lactic acid level.  Patient was initially ordered Zosyn and vancomycin for sepsis of unknown etiology, pharmacists has spoken with me requesting cancellation of the patient's vancomycin.  At this point, Zosyn will cover her urinary tract infection and I allowed for discontinuation of the vancomycin at this time.  Patient received 30 mL/kg bolus of IV fluids, ordered a 2nd time.  Hypotension remain.  I attempted to obtain consent for central line from the patient, she is much more awake at this time, and is now stating \"I don't want anything in my neck\".  She has twice refused to give consent for central line.  I have spoken with the pharmacist, Levophed was started through the peripheral IV.    FINAL IMPRESSION     1. Midline low back pain without sciatica, unspecified chronicity    2. Sepsis, due to unspecified organism (CMS-HCC)    3. Urinary tract infection, site unspecified    4. Narcotic overdose, undetermined intent, initial encounter      Critical care time 35 minutes              Electronically signed by: Sarbjit Schroeder, 4/15/2017 2:39 PM    "

## 2017-04-15 NOTE — IP AVS SNAPSHOT
" Home Care Instructions                                                                                                                  Name:Alie Morris  Medical Record Number:[id number]  CSN: [id number]    YOB: 1945   Age: 72 y.o.  Sex: female  HT:1.544 m (5' 0.79\") WT: 62.2 kg (137 lb 2 oz)          Admit Date: 4/15/2017     Discharge Date:   Today's Date: 4/19/2017  Attending Doctor:  Tyron Lima M.D.                  Allergies:  Ibuprofen and Penicillins            Discharge Instructions       Discharge Instructions    Discharged to home by taxi with self. Discharged via wheelchair, hospital escort: Yes.  Special equipment needed: Not Applicable    Be sure to schedule a follow-up appointment with your primary care doctor or any specialists within 5 days.     Discharge Plan:   Smoking Cessation Offered: Patient Refused  Influenza Vaccine Indication: Not indicated: Previously immunized this influenza season and > 8 years of age    I understand that a diet low in cholesterol, fat, and sodium is recommended for good health. Unless I have been given specific instructions below for another diet, I accept this instruction as my diet prescription.   Other diet: regular diabetic    Special Instructions: None    · Is patient discharged on Warfarin / Coumadin?   No     · Is patient Post Blood Transfusion?  No    Depression / Suicide Risk    As you are discharged from this [facility] facility, it is important to learn how to keep safe from harming yourself.    Recognize the warning signs:  · Abrupt changes in personality, positive or negative- including increase in energy   · Giving away possessions  · Change in eating patterns- significant weight changes-  positive or negative  · Change in sleeping patterns- unable to sleep or sleeping all the time   · Unwillingness or inability to communicate  · Depression  · Unusual sadness, discouragement and loneliness  · Talk of wanting to die  · Neglect of " personal appearance   · Rebelliousness- reckless behavior  · Withdrawal from people/activities they love  · Confusion- inability to concentrate     If you or a loved one observes any of these behaviors or has concerns about self-harm, here's what you can do:  · Talk about it- your feelings and reasons for harming yourself  · Remove any means that you might use to hurt yourself (examples: pills, rope, extension cords, firearm)  · Get professional help from the community (Mental Health, Substance Abuse, psychological counseling)  · Do not be alone:Call your Safe Contact- someone whom you trust who will be there for you.  · Call your local CRISIS HOTLINE 247-4936 or 492-917-0705  · Call your local Children's Mobile Crisis Response Team [place] (351) 764-9464 or www.Coupz  · Call the toll free National Suicide Prevention Hotlines   · National Suicide Prevention Lifeline 836-040-HWYZ (6719)  · [facility] Hope Line Network 800-SUICIDE (589-6441)           Discharge Medication Instructions:    Below are the medications your physician expects you to take upon discharge:    Review all your home medications and newly ordered medications with your doctor and/or pharmacist. Follow medication instructions as directed by your doctor and/or pharmacist.    Please keep your medication list with you and share with your physician.               Medication List      START taking these medications        Instructions    Morning Afternoon Evening Bedtime    cefUROXime 500 MG Tabs   Last time this was given:  500 mg on 4/19/2017 10:48 AM   Commonly known as:  CEFTIN        Take 1 Tab by mouth every 12 hours for 4 days.   Dose:  500 mg                          CONTINUE taking these medications        Instructions    Morning Afternoon Evening Bedtime    citalopram 20 MG Tabs   Last time this was given:  20 mg on 4/19/2017  8:15 AM   Commonly known as:  CELEXA        Take 20 mg by mouth every day.   Dose:  20 mg                           clonazepam 0.5 MG Tabs   Commonly known as:  KLONOPIN        Take 0.5 mg by mouth 2 times a day.   Dose:  0.5 mg                        gabapentin 100 MG Caps   Last time this was given:  100 mg on 4/19/2017  8:15 AM   Commonly known as:  NEURONTIN        Take 100-200 mg by mouth 2 Times a Day. RX states: 1 cap in AM and 2 cap in PM   Dose:  100-200 mg                        hydrochlorothiazide 25 MG Tabs   Commonly known as:  HYDRODIURIL        Take 1 Tab by mouth every day.   Dose:  25 mg                        lamotrigine 100 MG Tabs   Last time this was given:  100 mg on 4/18/2017  9:20 PM   Commonly known as:  LAMICTAL        Take 1 Tab by mouth every day.   Dose:  100 mg                        lisinopril 2.5 MG Tabs   Commonly known as:  PRINIVIL        Take 2.5 mg by mouth every day.   Dose:  2.5 mg                        metformin 500 MG Tabs   Commonly known as:  GLUCOPHAGE        Take 500 mg by mouth 2 times a day, with meals.   Dose:  500 mg                        oxycodone-acetaminophen  MG Tabs   Commonly known as:  PERCOCET-10        Take 1 Tab by mouth 4 times a day.   Dose:  1 Tab                        promethazine 25 MG Tabs   Commonly known as:  PHENERGAN        Take 25 mg by mouth every 4 hours.   Dose:  25 mg                        rosuvastatin 20 MG Tabs   Last time this was given:  20 mg on 4/19/2017  8:15 AM   Commonly known as:  CRESTOR        Take 1 Tab by mouth every evening.   Dose:  20 mg                        tizanidine 4 MG Tabs   Last time this was given:  4 mg on 4/19/2017  8:16 AM   Commonly known as:  ZANAFLEX        Take 4 mg by mouth every 6 hours as needed.   Dose:  4 mg                          STOP taking these medications     levothyroxine 50 MCG Tabs   Commonly known as:  SYNTHROID                    Where to Get Your Medications      Information about where to get these medications is not yet available     ! Ask your nurse or doctor about these medications     - cefUROXime 500 MG Tabs            Instructions           Diet / Nutrition:    Follow any diet instructions given to you by your doctor or the dietician, including how much salt (sodium) you are allowed each day.    If you are overweight, talk to your doctor about a weight reduction plan.    Activity:    Remain physically active following your doctor's instructions about exercise and activity.    Rest often.     Any time you become even a little tired or short of breath, SIT DOWN and rest.    Worsening Symptoms:    Report any of the following signs and symptoms to the doctor's office immediately:    *Pain of jaw, arm, or neck  *Chest pain not relieved by medication                               *Dizziness or loss of consciousness  *Difficulty breathing even when at rest   *More tired than usual                                       *Bleeding drainage or swelling of surgical site  *Swelling of feet, ankles, legs or stomach                 *Fever (>100ºF)  *Pink or blood tinged sputum  *Weight gain (3lbs/day or 5lbs /week)           *Shock from internal defibrillator (if applicable)  *Palpitations or irregular heartbeats                *Cool and/or numb extremities    Stroke Awareness    Common Risk Factors for Stroke include:    Age  Atrial Fibrillation  Carotid Artery Stenosis  Diabetes Mellitus  Excessive alcohol consumption  High blood pressure  Overweight   Physical inactivity  Smoking    Warning signs and symptoms of a stroke include:    *Sudden numbness or weakness of the face, arm or leg (especially on one side of the body).  *Sudden confusion, trouble speaking or understanding.  *Sudden trouble seeing in one or both eyes.  *Sudden trouble walking, dizziness, loss of balance or coordination.Sudden severe headache with no known cause.    It is very important to get treatment quickly when a stroke occurs. If you experience any of the above warning signs, call 911 immediately.                   Disclaimer          Quit Smoking / Tobacco Use:    I understand the use of any tobacco products increases my chance of suffering from future heart disease or stroke and could cause other illnesses which may shorten my life. Quitting the use of tobacco products is the single most important thing I can do to improve my health. For further information on smoking / tobacco cessation call a Toll Free Quit Line at 1-253.687.3111 (*National [employer]) or 1-677.152.9016 (American Lung Association) or you can access the web based program at www.lungusa.org.    [place] Tobacco Users Help Line:  (162) 717-4519       Toll Free: 1-270.472.9482  Quit Tobacco Program [facility] (618)104-1725    Crisis Hotline:    [place] Crisis Hotline:  3-500-XQQJWAP or 1-493.245.7060    [place] Crisis Hotline:    1-568.882.5566 or 751-633-2645    Discharge Survey:   Thank you for choosing [facility]. We hope we did everything we could to make your hospital stay a pleasant one. You may be receiving a phone survey and we would appreciate your time and participation in answering the questions. Your input is very valuable to us in our efforts to improve our service to our patients and their families.        My signature on this form indicates that:    1. I have reviewed and understand the above information.  2. My questions regarding this information have been answered to my satisfaction.  3. I have formulated a plan with my discharge nurse to obtain my prescribed medications for home.                  Disclaimer         __________________________________                     __________       ________                       Patient Signature                                                 Date                    Time

## 2017-04-15 NOTE — ED NOTES
1115- Dr Elizabeth at bedside, labs  incl # 1 bc ordered as well a stat US of abdo r/o aneurysm, alarcon placed , ua asent   1130-US completed, 2  L NS infusing simultaneously, narcan  0.3 mg given w little change in loc, did c/o back pain immediately after, lab called w a lactic acid 6.6  1215- L 3and 4 hung, norepinrphrinre started at mcg/min, patient refused central line

## 2017-04-15 NOTE — IP AVS SNAPSHOT
" <p align=\"LEFT\"><IMG SRC=\"[email or web address]\" alt=\"Image\" WIDTH=\"50%\" HEIGHT=\"200\" BORDER=\"\"></p>                   Name:Alie Morris  Medical Record Number:[id number]  CSN: [id number]    YOB: 1945   Age: 72 y.o.  Sex: female  HT:1.544 m (5' 0.79\") WT: 62.2 kg (137 lb 2 oz)          Admit Date: 4/15/2017     Discharge Date:   Today's Date: 4/19/2017  Attending Doctor:  Tyron Lima M.D.                  Allergies:  Ibuprofen and Penicillins             Medication List      Take these Medications        Instructions    cefUROXime 500 MG Tabs   Commonly known as:  CEFTIN    Take 1 Tab by mouth every 12 hours for 4 days.   Dose:  500 mg       citalopram 20 MG Tabs   Commonly known as:  CELEXA    Take 20 mg by mouth every day.   Dose:  20 mg       clonazepam 0.5 MG Tabs   Commonly known as:  KLONOPIN    Take 0.5 mg by mouth 2 times a day.   Dose:  0.5 mg       gabapentin 100 MG Caps   Commonly known as:  NEURONTIN    Take 100-200 mg by mouth 2 Times a Day. RX states: 1 cap in AM and 2 cap in PM   Dose:  100-200 mg       hydrochlorothiazide 25 MG Tabs   Commonly known as:  HYDRODIURIL    Take 1 Tab by mouth every day.   Dose:  25 mg       lamotrigine 100 MG Tabs   Commonly known as:  LAMICTAL    Take 1 Tab by mouth every day.   Dose:  100 mg       lisinopril 2.5 MG Tabs   Commonly known as:  PRINIVIL    Take 2.5 mg by mouth every day.   Dose:  2.5 mg       metformin 500 MG Tabs   Commonly known as:  GLUCOPHAGE    Take 500 mg by mouth 2 times a day, with meals.   Dose:  500 mg       oxycodone-acetaminophen  MG Tabs   Commonly known as:  PERCOCET-10    Take 1 Tab by mouth 4 times a day.   Dose:  1 Tab       promethazine 25 MG Tabs   Commonly known as:  PHENERGAN    Take 25 mg by mouth every 4 hours.   Dose:  25 mg       rosuvastatin 20 MG Tabs   Commonly known as:  CRESTOR    Take 1 Tab by mouth every evening.   Dose:  20 mg       tizanidine 4 MG Tabs   Commonly known as:  ZANAFLEX   " Take 4 mg by mouth every 6 hours as needed.   Dose:  4 mg

## 2017-04-15 NOTE — IP AVS SNAPSHOT
4/19/2017    Alie Morris  [id number]  [place] [address]    Dear Alie:    [facility] wants to ensure your discharge home is safe and you or your loved ones have had all of your questions answered regarding your care after you leave the hospital.    Below is a list of resources and contact information should you have any questions regarding your hospital stay, follow-up instructions, or active medical symptoms.    Questions or Concerns Regarding… Contact   Medical Questions Related to Your Discharge  (7 days a week, 8am-5pm) Contact a Nurse Care Coordinator   [phone]   Medical Questions Not Related to Your Discharge  (24 hours a day / 7 days a week)  Contact the Nurse Health Line   [phone]    Medications or Discharge Instructions Refer to your discharge packet   or contact your [facility] Primary Care Provider   [phone]   Follow-up Appointment(s) Schedule your appointment via If You Can   or contact Scheduling [phone]   Billing Review your statement via If You Can  or contact Billing [phone]   Medical Records Review your records via If You Can   or contact Medical Records [phone]     You may receive a telephone call within two days of discharge. This call is to make certain you understand your discharge instructions and have the opportunity to have any questions answered. You can also easily access your medical information, test results and upcoming appointments via the If You Can free online health management tool. You can learn more and sign up at [employer]/If You Can. For assistance setting up your If You Can account, please call [phone].    Once again, we want to ensure your discharge home is safe and that you have a clear understanding of any next steps in your care. If you have any questions or concerns, please do not hesitate to contact us, we are here for you. Thank you for choosing [facility] for your healthcare needs.    Sincerely,    Your [facility] Healthcare Team

## 2017-04-15 NOTE — ED NOTES
1300- Dr Castillo at bedside, wants patient to have a total of 6 L fluid, approx 75cc uop s/p almost 4 LNS, Levophed at 7.5 mcg/min, L #5 hung, patient has refused central line

## 2017-04-15 NOTE — PROGRESS NOTES
Pt requires to refuse use of central line. I talked to her twice and stressed the importance us having one. Pt continues to refuse at this time. Dr Muñiz notified.

## 2017-04-15 NOTE — PROGRESS NOTES
I examined the patient 4/15/2017 1:11 PM  Vital Signs:Pulse 76  Resp 32  Ht 1.524 m (5')  Wt 60.782 kg (134 lb)  BMI 26.17 kg/m2  SpO2 100%  Cardiac examination significant for Regular rate and rhythm  Pulmonary examination significant for Clear lung fileds  Capillary refill is slowed  Peripheral Pulse is 1+   Skin is flushed

## 2017-04-15 NOTE — ED NOTES
Patient somewhat more alert speaking more clearly, # 7 L NS infusing at 150cc/hr, Yonatan GOETZ CIC RN at bedside, recvd report, patient again refuses central line placement, approx 400cc urine in urimeter on transfer

## 2017-04-15 NOTE — IP AVS SNAPSHOT
Hoodin Access Code: [email or web address]  Expires: 5/19/2017  1:16 PM    Hoodin  A secure, online tool to manage your health information     [employer]’s Hoodin® is a secure, online tool that connects you to your personalized health information from the privacy of your home -- day or night - making it very easy for you to manage your healthcare. Once the activation process is completed, you can even access your medical information using the Hoodin vitaliy, which is available for free in the Apple Vitaliy store or Google Play store.     Hoodin provides the following levels of access (as shown below):   My Chart Features   [facility] Primary Care Doctor [facility]  Specialists [facility]  Urgent  Care Non-[facility]  Primary Care  Doctor   Email your healthcare team securely and privately 24/7 X X X X   Manage appointments: schedule your next appointment; view details of past/upcoming appointments X      Request prescription refills. X      View recent personal medical records, including lab and immunizations X X X X   View health record, including health history, allergies, medications X X X X   Read reports about your outpatient visits, procedures, consult and ER notes X X X X   See your discharge summary, which is a recap of your hospital and/or ER visit that includes your diagnosis, lab results, and care plan. X X       How to register for Hoodin:  1. Go to  [email or web address].  2. Click on the Sign Up Now box, which takes you to the New Member Sign Up page. You will need to provide the following information:  a. Enter your Hoodin Access Code exactly as it appears at the top of this page. (You will not need to use this code after you’ve completed the sign-up process. If you do not sign up before the expiration date, you must request a new code.)   b. Enter your date of birth.   c. Enter your home email address.   d. Click Submit, and follow the next screen’s instructions.  3. Create a Hoodin ID. This will be your Hoodin  login ID and cannot be changed, so think of one that is secure and easy to remember.  4. Create a [employer] password. You can change your password at any time.  5. Enter your Password Reset Question and Answer. This can be used at a later time if you forget your password.   6. Enter your e-mail address. This allows you to receive e-mail notifications when new information is available in Electro[employer].  7. Click Sign Up. You can now view your health information.    For assistance activating your [employer] account, call [phone]

## 2017-04-15 NOTE — ED NOTES
Ana from Lab called with critical result of Latic acid 6.3 at 1145. Critical lab result read back to Ana.   Dr. Schroeder notified of critical lab result at 1145.  Critical lab result read back by Dr. Schroeder.

## 2017-04-16 PROBLEM — N39.0 UTI (URINARY TRACT INFECTION): Status: ACTIVE | Noted: 2017-04-16

## 2017-04-16 PROBLEM — D53.9 ANEMIA, MACROCYTIC: Status: ACTIVE | Noted: 2017-04-16

## 2017-04-16 PROBLEM — N17.9 ACUTE RENAL FAILURE (ARF) (HCC): Status: ACTIVE | Noted: 2017-04-16

## 2017-04-16 PROBLEM — R74.01 TRANSAMINITIS: Status: ACTIVE | Noted: 2017-04-16

## 2017-04-16 PROBLEM — G93.40 ENCEPHALOPATHY ACUTE: Status: ACTIVE | Noted: 2017-04-16

## 2017-04-16 LAB
ANION GAP SERPL CALC-SCNC: 10 MMOL/L (ref 0–11.9)
BASOPHILS # BLD AUTO: 0.2 % (ref 0–1.8)
BASOPHILS # BLD: 0.02 K/UL (ref 0–0.12)
BUN SERPL-MCNC: 25 MG/DL (ref 8–22)
CALCIUM SERPL-MCNC: 7 MG/DL (ref 8.5–10.5)
CHLORIDE SERPL-SCNC: 106 MMOL/L (ref 96–112)
CO2 SERPL-SCNC: 21 MMOL/L (ref 20–33)
CREAT SERPL-MCNC: 1.43 MG/DL (ref 0.5–1.4)
EOSINOPHIL # BLD AUTO: 0.1 K/UL (ref 0–0.51)
EOSINOPHIL NFR BLD: 1.1 % (ref 0–6.9)
ERYTHROCYTE [DISTWIDTH] IN BLOOD BY AUTOMATED COUNT: 54 FL (ref 35.9–50)
GFR SERPL CREATININE-BSD FRML MDRD: 36 ML/MIN/1.73 M 2
GLUCOSE BLD-MCNC: 127 MG/DL (ref 65–99)
GLUCOSE BLD-MCNC: 138 MG/DL (ref 65–99)
GLUCOSE BLD-MCNC: 145 MG/DL (ref 65–99)
GLUCOSE BLD-MCNC: 98 MG/DL (ref 65–99)
GLUCOSE SERPL-MCNC: 110 MG/DL (ref 65–99)
HCT VFR BLD AUTO: 35.8 % (ref 37–47)
HGB BLD-MCNC: 10.9 G/DL (ref 12–16)
IMM GRANULOCYTES # BLD AUTO: 0.03 K/UL (ref 0–0.11)
IMM GRANULOCYTES NFR BLD AUTO: 0.3 % (ref 0–0.9)
LACTATE BLD-SCNC: 0.8 MMOL/L (ref 0.5–2)
LYMPHOCYTES # BLD AUTO: 1.26 K/UL (ref 1–4.8)
LYMPHOCYTES NFR BLD: 13.7 % (ref 22–41)
MCH RBC QN AUTO: 32.2 PG (ref 27–33)
MCHC RBC AUTO-ENTMCNC: 30.4 G/DL (ref 33.6–35)
MCV RBC AUTO: 105.6 FL (ref 81.4–97.8)
MONOCYTES # BLD AUTO: 0.74 K/UL (ref 0–0.85)
MONOCYTES NFR BLD AUTO: 8.1 % (ref 0–13.4)
NEUTROPHILS # BLD AUTO: 7.03 K/UL (ref 2–7.15)
NEUTROPHILS NFR BLD: 76.6 % (ref 44–72)
NRBC # BLD AUTO: 0 K/UL
NRBC BLD AUTO-RTO: 0 /100 WBC
PLATELET # BLD AUTO: 134 K/UL (ref 164–446)
PMV BLD AUTO: 9.5 FL (ref 9–12.9)
POTASSIUM SERPL-SCNC: 4 MMOL/L (ref 3.6–5.5)
RBC # BLD AUTO: 3.39 M/UL (ref 4.2–5.4)
SODIUM SERPL-SCNC: 137 MMOL/L (ref 135–145)
WBC # BLD AUTO: 9.2 K/UL (ref 4.8–10.8)

## 2017-04-16 PROCEDURE — 700111 HCHG RX REV CODE 636 W/ 250 OVERRIDE (IP): Performed by: INTERNAL MEDICINE

## 2017-04-16 PROCEDURE — 82962 GLUCOSE BLOOD TEST: CPT | Mod: 91

## 2017-04-16 PROCEDURE — 83605 ASSAY OF LACTIC ACID: CPT

## 2017-04-16 PROCEDURE — A9270 NON-COVERED ITEM OR SERVICE: HCPCS | Performed by: HOSPITALIST

## 2017-04-16 PROCEDURE — 700105 HCHG RX REV CODE 258: Performed by: INTERNAL MEDICINE

## 2017-04-16 PROCEDURE — 85025 COMPLETE CBC W/AUTO DIFF WBC: CPT

## 2017-04-16 PROCEDURE — A9270 NON-COVERED ITEM OR SERVICE: HCPCS | Performed by: INTERNAL MEDICINE

## 2017-04-16 PROCEDURE — 770022 HCHG ROOM/CARE - ICU (200)

## 2017-04-16 PROCEDURE — 700105 HCHG RX REV CODE 258: Performed by: HOSPITALIST

## 2017-04-16 PROCEDURE — 700102 HCHG RX REV CODE 250 W/ 637 OVERRIDE(OP): Performed by: HOSPITALIST

## 2017-04-16 PROCEDURE — 80048 BASIC METABOLIC PNL TOTAL CA: CPT

## 2017-04-16 PROCEDURE — 99291 CRITICAL CARE FIRST HOUR: CPT | Performed by: HOSPITALIST

## 2017-04-16 PROCEDURE — 700101 HCHG RX REV CODE 250: Performed by: INTERNAL MEDICINE

## 2017-04-16 PROCEDURE — 700102 HCHG RX REV CODE 250 W/ 637 OVERRIDE(OP): Performed by: INTERNAL MEDICINE

## 2017-04-16 RX ORDER — OXYCODONE HYDROCHLORIDE 5 MG/1
5 TABLET ORAL
Status: DISCONTINUED | OUTPATIENT
Start: 2017-04-16 | End: 2017-04-20 | Stop reason: HOSPADM

## 2017-04-16 RX ORDER — OXYCODONE HYDROCHLORIDE 5 MG/1
5-10 TABLET ORAL
Status: DISCONTINUED | OUTPATIENT
Start: 2017-04-16 | End: 2017-04-16

## 2017-04-16 RX ORDER — OXYCODONE HYDROCHLORIDE 10 MG/1
10 TABLET ORAL
Status: DISCONTINUED | OUTPATIENT
Start: 2017-04-16 | End: 2017-04-20 | Stop reason: HOSPADM

## 2017-04-16 RX ADMIN — ROSUVASTATIN CALCIUM 20 MG: 10 TABLET ORAL at 09:07

## 2017-04-16 RX ADMIN — LEVOTHYROXINE SODIUM 25 MCG: 50 TABLET ORAL at 05:12

## 2017-04-16 RX ADMIN — HEPARIN SODIUM 5000 UNITS: 5000 INJECTION, SOLUTION INTRAVENOUS; SUBCUTANEOUS at 22:10

## 2017-04-16 RX ADMIN — STANDARDIZED SENNA CONCENTRATE AND DOCUSATE SODIUM 2 TABLET: 8.6; 5 TABLET, FILM COATED ORAL at 09:07

## 2017-04-16 RX ADMIN — PIPERACILLIN AND TAZOBACTAM 2.25 G: 3; .375 INJECTION, POWDER, LYOPHILIZED, FOR SOLUTION INTRAVENOUS; PARENTERAL at 11:54

## 2017-04-16 RX ADMIN — TIZANIDINE 4 MG: 4 TABLET ORAL at 22:09

## 2017-04-16 RX ADMIN — OXYCODONE HYDROCHLORIDE 2.5 MG: 5 TABLET ORAL at 22:11

## 2017-04-16 RX ADMIN — ACETAMINOPHEN 650 MG: 325 TABLET, FILM COATED ORAL at 22:06

## 2017-04-16 RX ADMIN — SODIUM CHLORIDE: 9 INJECTION, SOLUTION INTRAVENOUS at 20:17

## 2017-04-16 RX ADMIN — HEPARIN SODIUM 5000 UNITS: 5000 INJECTION, SOLUTION INTRAVENOUS; SUBCUTANEOUS at 15:06

## 2017-04-16 RX ADMIN — LAMOTRIGINE 100 MG: 100 TABLET ORAL at 22:10

## 2017-04-16 RX ADMIN — TIZANIDINE 4 MG: 4 TABLET ORAL at 09:07

## 2017-04-16 RX ADMIN — OXYCODONE HYDROCHLORIDE 10 MG: 10 TABLET ORAL at 10:38

## 2017-04-16 RX ADMIN — SODIUM CHLORIDE: 9 INJECTION, SOLUTION INTRAVENOUS at 17:25

## 2017-04-16 RX ADMIN — SODIUM CHLORIDE: 9 INJECTION, SOLUTION INTRAVENOUS at 09:07

## 2017-04-16 RX ADMIN — PIPERACILLIN AND TAZOBACTAM 2.25 G: 3; .375 INJECTION, POWDER, LYOPHILIZED, FOR SOLUTION INTRAVENOUS; PARENTERAL at 00:50

## 2017-04-16 RX ADMIN — PIPERACILLIN AND TAZOBACTAM 2.25 G: 3; .375 INJECTION, POWDER, LYOPHILIZED, FOR SOLUTION INTRAVENOUS; PARENTERAL at 17:46

## 2017-04-16 RX ADMIN — NOREPINEPHRINE BITARTRATE 4 MCG/MIN: 1 INJECTION INTRAVENOUS at 07:41

## 2017-04-16 RX ADMIN — HEPARIN SODIUM 5000 UNITS: 5000 INJECTION, SOLUTION INTRAVENOUS; SUBCUTANEOUS at 05:12

## 2017-04-16 RX ADMIN — PIPERACILLIN AND TAZOBACTAM 2.25 G: 3; .375 INJECTION, POWDER, LYOPHILIZED, FOR SOLUTION INTRAVENOUS; PARENTERAL at 05:33

## 2017-04-16 RX ADMIN — SODIUM CHLORIDE 500 ML: 9 INJECTION, SOLUTION INTRAVENOUS at 20:18

## 2017-04-16 RX ADMIN — ACETAMINOPHEN 650 MG: 325 TABLET, FILM COATED ORAL at 05:11

## 2017-04-16 RX ADMIN — FAMOTIDINE 20 MG: 20 TABLET, FILM COATED ORAL at 09:07

## 2017-04-16 RX ADMIN — OXYCODONE HYDROCHLORIDE 5 MG: 5 TABLET ORAL at 17:25

## 2017-04-16 RX ADMIN — STANDARDIZED SENNA CONCENTRATE AND DOCUSATE SODIUM 2 TABLET: 8.6; 5 TABLET, FILM COATED ORAL at 22:09

## 2017-04-16 ASSESSMENT — PAIN SCALES - GENERAL
PAINLEVEL_OUTOF10: 5
PAINLEVEL_OUTOF10: 4
PAINLEVEL_OUTOF10: 3
PAINLEVEL_OUTOF10: 3
PAINLEVEL_OUTOF10: 2
PAINLEVEL_OUTOF10: 4
PAINLEVEL_OUTOF10: 5
PAINLEVEL_OUTOF10: 3
PAINLEVEL_OUTOF10: 8
PAINLEVEL_OUTOF10: 3
PAINLEVEL_OUTOF10: 8
PAINLEVEL_OUTOF10: 3

## 2017-04-16 ASSESSMENT — ENCOUNTER SYMPTOMS
NAUSEA: 0
MEMORY LOSS: 1
DIARRHEA: 0
FEVER: 0
VOMITING: 0
ROS GI COMMENTS: GOOD APPETITE.
CHILLS: 0

## 2017-04-16 NOTE — CARE PLAN
Problem: Communication  Goal: The ability to communicate needs accurately and effectively will improve  Intervention: Reorient patient to environment as needed  Pt verbally aroused, Completed admit profile answering questions approprietly, reoriented to situation and  Plan of care. All Pt's questions answered by writer.

## 2017-04-16 NOTE — H&P
CHIEF COMPLAINT:  Falling down more, altered mental status, lethargy.    HISTORY OF PRESENT ILLNESS:  This is a 72-year-old female with the history of   type 2 diabetes mellitus, hyperthyroidism, hyperlipidemia, chronic low back   pain, on chronic narcotics, tobacco dependence, and hypertension.  Patient is   lethargic, and was unable to provide a history, but awakens to voice and   answers/nods to questions.  Apparently, per my discussion with the ER   physician and the nurse in the ED and apparently patient's roommate, noted her   to be falling at home and has been altered and somnolent, prompting the   roommate to call [facility].  Patient is on chronic narcotics and the patient told   the [facility] that she took 2 tablets of her Percocet 10/325 mg.  Patient states   that she felt dizzy at home, but could not provide any specifics.    REVIEW OF SYSTEMS:  Unable to obtain due to somnolence and lethargy.    EMERGENCY DEPARTMENT COURSE:  The patient was initially evaluated in the   emergency department, was hypotensive with blood pressure 58/39, and hypoxic.    She was not tachycardic.  Initial blood workup was remarkable for sodium of   128, creatinine of 2.78 up from her normal baseline with BUN of 44, anion gap   14 with bicarbonate of 20.  She did not have any leukocytosis.  Platelet was   mildly low at 142.  AST was 244 with ALT of 120.  Lactate was 6.3.  Troponin   was low.  Urinalysis showed greater than 150 wbc's with large leukocyte   esterase and negative nitrite, and many bacteria.  Chest x-ray (my read)   did not show any infiltrates, consolidation, effusion, or pneumothorax.  She   was initially noted to have pinpoint pupils, and she was given Narcan with   slight improvement in her mentation and pupil size.  She was given Zosyn and   vancomycin, along with IV fluid boluses.  She remained hypotensive, and she   was started on Levophed.  Her urine output picked up after the fluid boluses.      PMH/PSH/FMH: I  personally reviewed all ancillary histories as noted.    PAST MEDICAL HISTORY:  Past Medical History   Diagnosis Date   • Hypertension    • Diabetes (CMS-HCC)    • Hyperlipidemia    • Thyroid disease    • Cancer (CMS-HCC)        PAST SURGICAL HISTORY:  Past Surgical History   Procedure Laterality Date   • Abdominal hysterectomy total     • Athroplasty     • Cholecystectomy         PERSONAL/SOCIAL HISTORY:  Social History   Substance Use Topics   • Smoking status: Current Some Day Smoker -- 0.50 packs/day for 50 years     Types: Cigarettes   • Smokeless tobacco: Never Used   • Alcohol Use: No       FAMILY MEDICAL HISTORY:  Reviewed. Non-contributory.    ALLERGIES:  Ibuprofen and Penicillins    HOME MEDICATIONS:  Medication Sig   levothyroxine (SYNTHROID) 25 MCG Tab Take 25 mcg by mouth every day.   metformin (GLUCOPHAGE) 500 MG Tab Take 500 mg by mouth 2 times a day, with meals. Pt states she takes 500mg in AM and 250mg in PM   tizanidine (ZANAFLEX) 4 MG Tab Take 4 mg by mouth 2 times a day.   rosuvastatin (CRESTOR) 20 MG Tab Take 1 Tab by mouth every evening.   lamotrigine (LAMICTAL) 100 MG Tab Take 1 Tab by mouth every day.   hydrochlorothiazide (HYDRODIURIL) 25 MG Tab Take 1 Tab by mouth every day.   Oxycodone-Acetaminophen (PERCOCET-10)  MG TABS Take 1-2 Tabs by mouth every four hours as needed for Severe Pain.           PHYSICAL EXAMINATION:  VITAL SIGNS:  Blood pressure 106/36, heart rate 93, respiratory rate 21, and   oxygen saturation 100% on 10 liters by facemask.  CONSTITUTIONAL:  Somnolent, awakens to voice, but easily goes to lethargy.    Not in cardiorespiratory distress.  HENT:  Dry lips and oral mucosa.  No tonsillopharyngeal congestion or exudates.  EYES: PERRLA, pink conjuctivae, (-) icteric sclerae  NECK: (-) cervical lymphadenopathy, (-) neck rigidity  CARDIOVASCULAR: Distinct heart sounds, RRR, (-) murmurs, rubs, gallops, (-) LE edema  RESPIRATORY: Equal chest expansion, clear breath sounds,  (-) crackles/wheezes/rales/rhonchi  GASTROINTESTINAL:  Normoactive bowel sounds.  Soft and nontender abdomen.    Richardson catheter in place, draining concentrated dirty urine.  MUSCULOSKELETAL: (-) joint swelling/tenderness, (-) joint deformities, (-) muscle tenderness,   (-) gross limitation of movement of 4 extremities  SKIN: (-) erythema, warmth, rashes, ulcers, open wounds  PSYCHIATRIC:  Unable to assess.  NEUROLOGIC: Non-focal, moves all 4 extremities, sensory grossly intact      PERTINENT DIAGNOSTIC RESULTS:  Reviewed, and as mentioned above. Please refer to ED course.      ASSESSMENT:  1.  Septic shock secondary to urinary tract infection.  2.  Urinary tract infection.  3.  Acute metabolic encephalopathy secondary to sepsis, and narcotic overdose.  4.  Acute hypoxic respiratory failure secondary to sepsis and encephalopathy.  5.  Hypovolemic, hyponatremia due to sepsis.  6.  Acute kidney injury secondary to sepsis.  7.  Thrombocytopenia, likely reactive to sepsis.  8.  Lactic acidosis secondary to sepsis, and metformin  9.  Type 2 diabetes mellitus.  10.  Hypothyroidism.  11.  Hyperlipidemia.  12.  Chronic low back pain with narcotic dependence.  13.  Tobacco dependence.  14.  Chronic hypertension.    PLAN:  ---  I will admit her to the intensive care unit.  I anticipate that she will   need at least 2 midnights hospital stay to provide medically necessary   services.  ---  I think she is still dry with her urine output only just picking up after   4 liters of fluids.  She may be 5-6 liters fluid deficient.  I will give 2   more liters of IV fluid boluses, and continue her on pressor support with   Levophed to keep map greater than 65.  Patient is currently refusing central   venous catheter placement - we will see if her blood pressure improves with   further IV fluids.  We will monitor her urine output closely.  Consider   steroids, if her hypotension persisted despite IV fluids and pressors.  We   will do  serial lactate monitoring.  I will continue her on normal saline at   150 mL per hour after her boluses.  ---  I will put her on Zosyn IV for now, and follow urine cultures and blood   cultures.  Escalate once cultures are back.  ---  I will continue her on bronchodilator nebulization with RT protocol and   continue oxygen support.  ---  Follow sodium and renal function with IV fluid rehydration.  Repeat BMP in   the morning.  ---  We will hold blood pressure medications for now due to tenuous   hemodynamics.  ---  I will hold all her narcotics and sedating medications for now secondary   to her encephalopathy.  We will do neuro checks.  ---  Resume home dose Synthroid.  Check TSH.  ---  I will resume her home dose Lamictal and Zanaflex.  ---  I will hold her metformin for now given her sepsis and lactic acidosis.    I will put her on sliding scale insulin.      Deep venous thrombosis prophylaxis - heparin subcutaneously.  Gastrointestinal prophylaxis - not indicated.  Code successful - code.       ____________________________________     Michael Castillo M.D.    SAUL / MYRIAM    DD:  04/15/2017 17:20:00  DT:  04/15/2017 18:41:17    D#:  [id number]  Job#:  [id number]

## 2017-04-16 NOTE — CARE PLAN
"Problem: Knowledge Deficit  Goal: Knowledge of disease process/condition, treatment plan, diagnostic tests, and medications will improve  Outcome: PROGRESSING SLOWER THAN EXPECTED  Reinforced education on sepsis.  Pt reports being confused about her \"blood pressure problems\" and requests additional pain medication.  MD notified     Problem: Fluid Volume:  Goal: Will maintain balanced intake and output  Outcome: PROGRESSING AS EXPECTED  Pt is receiving IV fluids and is voiding adequately.  RN monitoring for signs of fluid overload.  Pt reports feeling thirsty is drink some water in between meals        "

## 2017-04-16 NOTE — PROCEDURES
Procedure: central line    Indication: septic shock    Consent: signed by Ms. Morris    Description: a time-out was called. The right neck was prepped and draped in a sterile fashion.  Using sterile ultrasound guidance, the right internal jugular vein was identified. 2 mL of 1% lidocaine was used for anesthesia.  A large bore needle was used to aspirate dark, red, non-pulsating blood. Seldinger technique was used to place the central venous catheter. All three ports were aspirated and flushed with sterile saline.  The central line was adhered to the skin with sutures.     Blood loss: 1  mL    A stat chest x-ray is pending at this time.

## 2017-04-16 NOTE — CARE PLAN
Problem: Safety  Goal: Will remain free from falls  Intervention: Implement fall precautions  Pt educated on safety precautions and plan of care by writer.

## 2017-04-16 NOTE — PROGRESS NOTES
Hospital Medicine Progress Note, Adult, Complex               Author: Anup Muñzi Date & Time created: 4/16/2017  7:56 AM     Interval History:  Pt seen and evaluated in the ICU. Ms. Morris has a hx of DM and tobacco use that presented to the ER on 4/15 with weakness. She was found to have a UTI with a lactic acid of 6 and marked hypotension despite 6 liters of NS boluses. She has been admitted to the ICU for IV abx, fluids, and pressors. Ms. Morris has no recollection of yesterday's events. She remains on pressors.   She had 3 liters urine over night.    Review of Systems:  Review of Systems   Constitutional: Negative for fever and chills.   Cardiovascular: Negative for chest pain and leg swelling.   Gastrointestinal: Negative for nausea, vomiting and diarrhea.        Good appetite.   Skin: Negative.    Psychiatric/Behavioral: Positive for memory loss.       Physical Exam:  Physical Exam   Constitutional: She is oriented to person, place, and time. No distress.   Neck: Neck supple.   Right IJ central line   Cardiovascular: Regular rhythm.    No murmur heard.  Pulmonary/Chest: Effort normal. No respiratory distress.   Abdominal: Soft. She exhibits no distension.   Genitourinary:   alarcon   Musculoskeletal: She exhibits no edema or tenderness.   Neurological: She is alert and oriented to person, place, and time.   Skin: Skin is warm and dry. There is pallor.       Labs:        Invalid input(s): [id number]  Recent Labs      04/15/17   1125   TROPONINI  0.01     Recent Labs      04/15/17   1125  04/16/17   0246   SODIUM  128*  137   POTASSIUM  5.0  4.0   CHLORIDE  94*  106   CO2  20  21   BUN  44*  25*   CREATININE  2.78*  1.43*   CALCIUM  8.5  7.0*     Recent Labs      04/15/17   1125  04/16/17   0246   ALTSGPT  120*   --    ASTSGOT  244*   --    ALKPHOSPHAT  66   --    TBILIRUBIN  0.5   --    GLUCOSE  83  110*     Recent Labs      04/15/17   1125  04/16/17   0246   RBC  3.21*  3.39*   HEMOGLOBIN  10.7*  10.9*    HEMATOCRIT  34.2*  35.8*   PLATELETCT  142*  134*     Recent Labs      04/15/17   1125  17   0246   WBC  8.2  9.2   NEUTSPOLYS  69.10  76.60*   LYMPHOCYTES  20.30*  13.70*   MONOCYTES  8.10  8.10   EOSINOPHILS  1.90  1.10   BASOPHILS  0.20  0.20   ASTSGOT  244*   --    ALTSGPT  120*   --    ALKPHOSPHAT  66   --    TBILIRUBIN  0.5   --            Hemodynamics:  Temp (24hrs), Av.7 °C (96.2 °F), Min:35.7 °C (96.2 °F), Max:35.7 °C (96.2 °F)  Temperature: (!) 35.7 °C (96.2 °F)  Pulse  Av.7  Min: 68  Max: 102Heart Rate (Monitored): 86  NIBP: (!) 92/46 mmHg  CVP (mm Hg): (!) 13 MM HG  Respiratory:    Respiration: (!) 28, Pulse Oximetry: 99 %, O2 Daily Delivery Respiratory : OxyMask     Work Of Breathing / Effort: Mild  RUL Breath Sounds: Diminished, RML Breath Sounds: Diminished, RLL Breath Sounds: Diminished, ROGER Breath Sounds: Diminished, LLL Breath Sounds: Diminished  Fluids:    Intake/Output Summary (Last 24 hours) at 17 0756  Last data filed at 17 0700   Gross per 24 hour   Intake 789.74 ml   Output   7250 ml   Net -6460.26 ml     Weight: 67.8 kg (149 lb 7.6 oz)  GI/Nutrition:  No orders of the defined types were placed in this encounter.     Medical Decision Making, by Problem:  Active Hospital Problems    Diagnosis   • Septic shock (CMS-[facility]) [A41.9, R65.21]from UTI, requiring IV pressors, levophed. Initial lactic was 6. IV fluids. Cultures drawn, empiric zosyn   • Acute renal failure (ARF) (CMS-[facility]) [N17.9]initial Cr was 2.78 and now 1.43 with fluids. Check BMP tomorrow   • UTI (urinary tract infection) [N39.0]   • Anemia, macrocytic [D53.9]   • Transaminitis [R74.0]CMP ordered for tomorrow   • Encephalopathy acute [G93.40]likely delirium from infection   • Tobacco dependence [F17.200]   • Diabetes mellitus type 2 in nonobese (CMS-HCC) [E11.9]sliding scale   • Chronic low back pain [M54.5, G89.29]on percocet 10 mg at home   • Hyperlipidemia [E78.5]   • Hypothyroidism [E03.9]replacement      Pt is critically ill in the ICU, 32 minutes of critical care time spent with patient and nursing and in specific management of septic shock requiring IV pressors. See orders.   Labs reviewed and Medications reviewed  Richardson catheter: Critically Ill - Requiring Accurate Measurement of Urinary Output  Central line in place: Sepsis    DVT Prophylaxis: Heparin

## 2017-04-16 NOTE — PROGRESS NOTES
Late entry    Pt admitted to floor at 1525 from ED via hospital bed. All lines and gtts verified. All belongings with pt. Pt lethargic. Complaints of back pain. Dr Muñiz notified. Awaiting orders.

## 2017-04-17 LAB
ALBUMIN SERPL BCP-MCNC: 2.8 G/DL (ref 3.2–4.9)
ALBUMIN/GLOB SERPL: 1.2 G/DL
ALP SERPL-CCNC: 73 U/L (ref 30–99)
ALT SERPL-CCNC: 118 U/L (ref 2–50)
ANION GAP SERPL CALC-SCNC: 7 MMOL/L (ref 0–11.9)
AST SERPL-CCNC: 284 U/L (ref 12–45)
BACTERIA UR CULT: ABNORMAL
BACTERIA UR CULT: ABNORMAL
BILIRUB SERPL-MCNC: 0.4 MG/DL (ref 0.1–1.5)
BUN SERPL-MCNC: 14 MG/DL (ref 8–22)
CALCIUM SERPL-MCNC: 7.5 MG/DL (ref 8.5–10.5)
CHLORIDE SERPL-SCNC: 104 MMOL/L (ref 96–112)
CO2 SERPL-SCNC: 26 MMOL/L (ref 20–33)
CREAT SERPL-MCNC: 1.12 MG/DL (ref 0.5–1.4)
ERYTHROCYTE [DISTWIDTH] IN BLOOD BY AUTOMATED COUNT: 51.2 FL (ref 35.9–50)
GFR SERPL CREATININE-BSD FRML MDRD: 48 ML/MIN/1.73 M 2
GLOBULIN SER CALC-MCNC: 2.3 G/DL (ref 1.9–3.5)
GLUCOSE BLD-MCNC: 138 MG/DL (ref 65–99)
GLUCOSE BLD-MCNC: 84 MG/DL (ref 65–99)
GLUCOSE BLD-MCNC: 88 MG/DL (ref 65–99)
GLUCOSE BLD-MCNC: 94 MG/DL (ref 65–99)
GLUCOSE SERPL-MCNC: 110 MG/DL (ref 65–99)
HCT VFR BLD AUTO: 28.9 % (ref 37–47)
HGB BLD-MCNC: 9.3 G/DL (ref 12–16)
MCH RBC QN AUTO: 32.6 PG (ref 27–33)
MCHC RBC AUTO-ENTMCNC: 32.2 G/DL (ref 33.6–35)
MCV RBC AUTO: 101.4 FL (ref 81.4–97.8)
PLATELET # BLD AUTO: 114 K/UL (ref 164–446)
PMV BLD AUTO: 9.2 FL (ref 9–12.9)
POTASSIUM SERPL-SCNC: 3.1 MMOL/L (ref 3.6–5.5)
PROT SERPL-MCNC: 5.1 G/DL (ref 6–8.2)
RBC # BLD AUTO: 2.85 M/UL (ref 4.2–5.4)
SIGNIFICANT IND 70042: ABNORMAL
SITE SITE: ABNORMAL
SODIUM SERPL-SCNC: 137 MMOL/L (ref 135–145)
SOURCE SOURCE: ABNORMAL
WBC # BLD AUTO: 4.9 K/UL (ref 4.8–10.8)

## 2017-04-17 PROCEDURE — 700102 HCHG RX REV CODE 250 W/ 637 OVERRIDE(OP): Performed by: INTERNAL MEDICINE

## 2017-04-17 PROCEDURE — 700111 HCHG RX REV CODE 636 W/ 250 OVERRIDE (IP): Performed by: INTERNAL MEDICINE

## 2017-04-17 PROCEDURE — 700102 HCHG RX REV CODE 250 W/ 637 OVERRIDE(OP): Performed by: HOSPITALIST

## 2017-04-17 PROCEDURE — 700111 HCHG RX REV CODE 636 W/ 250 OVERRIDE (IP): Performed by: HOSPITALIST

## 2017-04-17 PROCEDURE — 85027 COMPLETE CBC AUTOMATED: CPT

## 2017-04-17 PROCEDURE — 700105 HCHG RX REV CODE 258: Performed by: INTERNAL MEDICINE

## 2017-04-17 PROCEDURE — 700105 HCHG RX REV CODE 258: Performed by: HOSPITALIST

## 2017-04-17 PROCEDURE — A9270 NON-COVERED ITEM OR SERVICE: HCPCS | Performed by: INTERNAL MEDICINE

## 2017-04-17 PROCEDURE — 80053 COMPREHEN METABOLIC PANEL: CPT

## 2017-04-17 PROCEDURE — A9270 NON-COVERED ITEM OR SERVICE: HCPCS | Performed by: HOSPITALIST

## 2017-04-17 PROCEDURE — 770006 HCHG ROOM/CARE - MED/SURG/GYN SEMI*

## 2017-04-17 PROCEDURE — 82962 GLUCOSE BLOOD TEST: CPT

## 2017-04-17 PROCEDURE — 99233 SBSQ HOSP IP/OBS HIGH 50: CPT | Performed by: HOSPITALIST

## 2017-04-17 RX ORDER — LISINOPRIL 2.5 MG/1
2.5 TABLET ORAL DAILY
COMMUNITY

## 2017-04-17 RX ORDER — LEVOTHYROXINE SODIUM 0.05 MG/1
50 TABLET ORAL DAILY
Status: ON HOLD | COMMUNITY
End: 2017-05-08 | Stop reason: SDUPTHER

## 2017-04-17 RX ORDER — POTASSIUM CHLORIDE 1.5 G/1.58G
20 POWDER, FOR SOLUTION ORAL 2 TIMES DAILY
Status: DISCONTINUED | OUTPATIENT
Start: 2017-04-17 | End: 2017-04-20 | Stop reason: HOSPADM

## 2017-04-17 RX ORDER — PROMETHAZINE HYDROCHLORIDE 25 MG/1
25 TABLET ORAL EVERY 4 HOURS
COMMUNITY
End: 2017-06-07

## 2017-04-17 RX ORDER — POTASSIUM CHLORIDE 20 MEQ/1
20 TABLET, EXTENDED RELEASE ORAL 2 TIMES DAILY
Status: DISCONTINUED | OUTPATIENT
Start: 2017-04-17 | End: 2017-04-20 | Stop reason: HOSPADM

## 2017-04-17 RX ORDER — GABAPENTIN 100 MG/1
100-200 CAPSULE ORAL 2 TIMES DAILY
COMMUNITY

## 2017-04-17 RX ORDER — CLONAZEPAM 0.5 MG/1
0.5 TABLET ORAL 2 TIMES DAILY
COMMUNITY

## 2017-04-17 RX ORDER — CITALOPRAM 20 MG/1
20 TABLET ORAL DAILY
COMMUNITY

## 2017-04-17 RX ADMIN — PIPERACILLIN AND TAZOBACTAM 2.25 G: 3; .375 INJECTION, POWDER, LYOPHILIZED, FOR SOLUTION INTRAVENOUS; PARENTERAL at 06:17

## 2017-04-17 RX ADMIN — LEVOTHYROXINE SODIUM 25 MCG: 50 TABLET ORAL at 05:08

## 2017-04-17 RX ADMIN — HEPARIN SODIUM 5000 UNITS: 5000 INJECTION, SOLUTION INTRAVENOUS; SUBCUTANEOUS at 14:08

## 2017-04-17 RX ADMIN — CEFTRIAXONE SODIUM 2 G: 2 INJECTION, POWDER, FOR SOLUTION INTRAMUSCULAR; INTRAVENOUS at 10:56

## 2017-04-17 RX ADMIN — TIZANIDINE 4 MG: 4 TABLET ORAL at 22:19

## 2017-04-17 RX ADMIN — FAMOTIDINE 20 MG: 20 TABLET, FILM COATED ORAL at 09:16

## 2017-04-17 RX ADMIN — HEPARIN SODIUM 5000 UNITS: 5000 INJECTION, SOLUTION INTRAVENOUS; SUBCUTANEOUS at 05:09

## 2017-04-17 RX ADMIN — ONDANSETRON 4 MG: 4 TABLET, ORALLY DISINTEGRATING ORAL at 17:57

## 2017-04-17 RX ADMIN — ROSUVASTATIN CALCIUM 20 MG: 10 TABLET ORAL at 09:15

## 2017-04-17 RX ADMIN — TIZANIDINE 4 MG: 4 TABLET ORAL at 09:16

## 2017-04-17 RX ADMIN — HEPARIN SODIUM 5000 UNITS: 5000 INJECTION, SOLUTION INTRAVENOUS; SUBCUTANEOUS at 22:18

## 2017-04-17 RX ADMIN — ACETAMINOPHEN 650 MG: 325 TABLET, FILM COATED ORAL at 05:08

## 2017-04-17 RX ADMIN — PIPERACILLIN AND TAZOBACTAM 2.25 G: 3; .375 INJECTION, POWDER, LYOPHILIZED, FOR SOLUTION INTRAVENOUS; PARENTERAL at 01:06

## 2017-04-17 RX ADMIN — OXYCODONE HYDROCHLORIDE 5 MG: 5 TABLET ORAL at 17:18

## 2017-04-17 RX ADMIN — LAMOTRIGINE 100 MG: 100 TABLET ORAL at 22:19

## 2017-04-17 RX ADMIN — OXYCODONE HYDROCHLORIDE 2.5 MG: 5 TABLET ORAL at 05:08

## 2017-04-17 RX ADMIN — POTASSIUM CHLORIDE 20 MEQ: 1500 TABLET, EXTENDED RELEASE ORAL at 22:19

## 2017-04-17 RX ADMIN — OXYCODONE HYDROCHLORIDE 5 MG: 5 TABLET ORAL at 14:08

## 2017-04-17 RX ADMIN — POTASSIUM CHLORIDE 20 MEQ: 1500 TABLET, EXTENDED RELEASE ORAL at 10:59

## 2017-04-17 ASSESSMENT — PAIN SCALES - GENERAL
PAINLEVEL_OUTOF10: 10
PAINLEVEL_OUTOF10: 3
PAINLEVEL_OUTOF10: 10
PAINLEVEL_OUTOF10: 3
PAINLEVEL_OUTOF10: 4
PAINLEVEL_OUTOF10: 3

## 2017-04-17 ASSESSMENT — ENCOUNTER SYMPTOMS
NERVOUS/ANXIOUS: 1
NAUSEA: 0
INSOMNIA: 1
VOMITING: 0
FEVER: 0
MEMORY LOSS: 1
CHILLS: 0
DIARRHEA: 0
ROS GI COMMENTS: POOR APPETITE.

## 2017-04-17 NOTE — PROGRESS NOTES
Cardiac Monitoring Summary    Heart Rate:   Systolic Blood Pressure:   Heart Rhythm: NSR-ST with 1st degree AV Block  CA Interval: 0.22  QRS Length: 0.14  QT Length: 0.34

## 2017-04-17 NOTE — CARE PLAN
Problem: Venous Thromboembolism (VTW)/Deep Vein Thrombosis (DVT) Prevention:  Goal: Patient will participate in Venous Thrombosis (VTE)/Deep Vein Thrombosis (DVT)Prevention Measures  Outcome: PROGRESSING AS EXPECTED  Pt receiving heparin and wearing SCDs    Problem: Psychosocial Needs:  Goal: Level of anxiety will decrease  Anxiety and pain level continuously addressed by RN.  Pt engaged and working with RN to minimize symptoms

## 2017-04-17 NOTE — PROGRESS NOTES
Cardiac Monitoring Summary    Heart Rate: 55-85  Systolic Blood Pressure:   Heart Rhythm: SB-NSR  MN Interval: 0.16  QRS Length: 0.08  QT Length: 0.46

## 2017-04-17 NOTE — PROGRESS NOTES
NURSING REPORT SHEET    Patient Name: Alie Morris  Sex: Female  Age: 72 y.o.  Admitted: 4/15/2017  Room Number: T612/00  Code Status: Full Code    Providers:    Diagnosis  Septic shock (CMS-HCC)    Allergies   Allergen Reactions   • Ibuprofen Rash     Rash   • Penicillins Rash     Rash  Tolerated Zosyn 4/15/2017     Past Medical History   Diagnosis Date   • Hypertension    • Diabetes (CMS-HCC)    • Hyperlipidemia    • Thyroid disease    • Cancer (CMS-HCC)        Vital Signs    Heart Rate (Monitored): 68  NIBP: 110/58 mmHg  Respiration: 18  Pulse Oximetry: 96%    Neurological     The patient is Alert and Oriented x 4, CAM-ICU is Positive currently PERRLA, cranial reflexes are intact, and strength is equal bilaterally.     Hemodynamics    Heart Rate: 60-75 Rhythm: NSR CVP: 6-9  Pulses: brachial R:1/L:1, dorsalis pedis R:1/L:1, posterior tibial R:1/L:1  Edema: general, Negative    Respiratory    Normal respiratory effort. Chest is clear. Changed the patient over to room air.    GI    Current Diet: DIET ORDER  Last BM: 4/13  normal bowel sounds          Intake/Output Summary (Last 24 hours) at 04/16/17 2003  Last data filed at 04/16/17 1800   Gross per 24 hour   Intake 1589.48 ml   Output   6150 ml   Net -4560.52 ml     Patient Lines/Drains/Airways Status    Active Catheter     Name: Placement date: Placement time: Site: Days:    Urinary Catheter Indwelling Catheter 16 04/15/17  1130  Indwelling Catheter  1                ID    TMax: Afebrile  Current ABX:     Lines    Right IJ Triple    Drips    0.9% Sodium Chloride @ 75mL/hr  Norepinephrine @ 1mcg/min     Skin    Intact    Labs    Lab Results   Component Value Date/Time    WBC 9.2 04/16/2017 02:46 AM    RBC 3.39* 04/16/2017 02:46 AM    HEMOGLOBIN 10.9* 04/16/2017 02:46 AM    HEMATOCRIT 35.8* 04/16/2017 02:46 AM    .6* 04/16/2017 02:46 AM    MCH 32.2 04/16/2017 02:46 AM    MCHC 30.4* 04/16/2017 02:46 AM    MPV 9.5 04/16/2017 02:46 AM      Lab Results    Component Value Date/Time    SODIUM 137 04/16/2017 02:46 AM    POTASSIUM 4.0 04/16/2017 02:46 AM    CHLORIDE 106 04/16/2017 02:46 AM    CO2 21 04/16/2017 02:46 AM    GLUCOSE 110* 04/16/2017 02:46 AM    BUN 25* 04/16/2017 02:46 AM    CREATININE 1.43* 04/16/2017 02:46 AM     BLOOD CULTURE   Date Value Ref Range Status   04/15/2017   Preliminary    No Growth    Note: Blood cultures are incubated for 5 days and  are monitored continuously.Positive blood cultures  are called to the RN and reported as soon as  they are identified.          To Be Addressed    · Hypotension  · Will Administer 500 mL Bolus  · Titrate Levo  · Reduce Opioid Use  · Will try 2.5 mg Oxycodone with 650 mg APAP to try and manage pain.  · Poor Oral Intake  · Discussed With Patient  · Offered other options  · Patient states that she is having some nausea with how much mass the food we have to offer has.  · Dietary Consult Ordered  · Hopefully provide some supplements

## 2017-04-17 NOTE — PROGRESS NOTES
Med rec complete per pt and home pharmacy   Home Pharmacy: [facility] on [address]   Allergies reviewed  No oral ABX within last 30 days

## 2017-04-17 NOTE — DISCHARGE PLANNING
Admit UTI sepsis.  Zosyn day .  Off levo since 2AM.    SR.  Eating well.  Mobilizing.  .5 L NC 02.      71 y/o single female.  Family?   [employer] Medicare and Medicaid.      Likely DC home when cleared.     Notification from BSN.  Patient is participant in [facility] OP program 3x weekly.  Requesting Med hx.  Called [facility] and provided fax number.  Records are to be faxed.

## 2017-04-17 NOTE — DIETARY
Nutrition Services:  Nutrition Consult - Poor PO    Pt is a 72 yr old female admitted with Septic shock.    Past Medical History:  Hypertension, Diabetes, Hyperlipidemia, Thyroid disease (hyperthyroidism), Cancer    Spoke with pt at bedside.  Pt was very lethargic.  Pt states that her appetite has been low but is getting a bit better.  Pt reports low appetite in the few weeks PTA.  Pt denies any wt loss and states that her UBW is ~ 62 kg (137 lbs) - current wt is 65.3 kg (143 lbs).  Pt states that she follows a diabetic diet at home.     Current diet:  Diabetic  Weight:  65.3 kg (143 lbs) - 4/17 stand up scale  BMI:  27  Pertinent labs:  Potassium 3.1, Glucose 110, , , Albumin 2.8, Total protein 5.1  Pertinent medications:  Heparin, SSI, Synthroid, Kdur, KLOR-CON, Crestor, Pericolace  Skin:  No skin breakdown noted at this time  GI:  Lat BM this morning per RN in rounds    Recommendation:    Record % of meals consumed in ADLs to help monitor PO adequacy  Nutrition Representative to see pt for meals daily  RD to monitor wt and lab trends    RD will continue to follow per department policy.

## 2017-04-17 NOTE — PROGRESS NOTES
Hospital Medicine Progress Note, Adult, Complex               Author: Anup Muñiz Date & Time created: 4/17/2017  9:06 AM     Interval History:  Pt seen and evaluated in the ICU. Ms. Morrsi has a hx of DM and tobacco use that presented to the ER on 4/15 with weakness. She was found to have a UTI with a lactic acid of 6 and marked hypotension despite 6 liters of NS boluses. She has been admitted to the ICU for IV abx, fluids, and pressors.  She required levophed last night pressors but was turned off this morning at 02:00.   She had 800 liters urine over night.   She requests her home clonopin for sleep. Ms. Morris is quite tearful and notes that she has bipolar d/o. She is not sure what psych meds she is on.   Review of Systems:  Review of Systems   Constitutional: Negative for fever and chills.   Cardiovascular: Negative for chest pain and leg swelling.   Gastrointestinal: Negative for nausea, vomiting and diarrhea.        Poor appetite.   Genitourinary: Negative for dysuria.   Psychiatric/Behavioral: Positive for memory loss. The patient is nervous/anxious and has insomnia.        Physical Exam:  Physical Exam   Constitutional: She is oriented to person, place, and time. No distress.   Neck: Neck supple.   Right IJ central line   Cardiovascular: Regular rhythm.    No murmur heard.  Pulmonary/Chest: Effort normal. No respiratory distress.   Abdominal: Soft. She exhibits no distension.   Musculoskeletal: She exhibits no edema or tenderness.   Neurological: She is alert and oriented to person, place, and time.   Skin: Skin is warm and dry. There is pallor.   Psychiatric:   crying       Labs:        Invalid input(s): [id number]  Recent Labs      04/15/17   1125   TROPONINI  0.01     Recent Labs      04/15/17   1125  04/16/17   0246  04/17/17   0313   SODIUM  128*  137  137   POTASSIUM  5.0  4.0  3.1*   CHLORIDE  94*  106  104   CO2  20  21  26   BUN  44*  25*  14   CREATININE  2.78*  1.43*  1.12   CALCIUM  8.5   7.0*  7.5*     Recent Labs      04/15/17   1125  17   0246  17   0313   ALTSGPT  120*   --   118*   ASTSGOT  244*   --   284*   ALKPHOSPHAT  66   --   73   TBILIRUBIN  0.5   --   0.4   GLUCOSE  83  110*  110*     Recent Labs      04/15/17   1125  17   0246  17   0313   RBC  3.21*  3.39*  2.85*   HEMOGLOBIN  10.7*  10.9*  9.3*   HEMATOCRIT  34.2*  35.8*  28.9*   PLATELETCT  142*  134*  114*     Recent Labs      04/15/17   1125  17   0246  17   0313   WBC  8.2  9.2  4.9   NEUTSPOLYS  69.10  76.60*   --    LYMPHOCYTES  20.30*  13.70*   --    MONOCYTES  8.10  8.10   --    EOSINOPHILS  1.90  1.10   --    BASOPHILS  0.20  0.20   --    ASTSGOT  244*   --   284*   ALTSGPT  120*   --   118*   ALKPHOSPHAT  66   --   73   TBILIRUBIN  0.5   --   0.4           Hemodynamics:  Temp (24hrs), Av.1 °C (98.8 °F), Min:37 °C (98.6 °F), Max:37.2 °C (99 °F)  Temperature: 37.1 °C (98.7 °F)  Pulse  Av  Min: 57  Max: 102Heart Rate (Monitored): 82  NIBP: 122/66 mmHg  CVP (mm Hg): (!) 7 MM HG  Respiratory:    Respiration: 17, Pulse Oximetry: 94 %        RUL Breath Sounds: Diminished;Clear, RML Breath Sounds: Diminished;Clear, RLL Breath Sounds: Diminished;Clear, ROGER Breath Sounds: Diminished;Clear, LLL Breath Sounds: Diminished;Clear  Fluids:    Intake/Output Summary (Last 24 hours) at 17 0906  Last data filed at 17 0200   Gross per 24 hour   Intake 2487.89 ml   Output   2525 ml   Net -37.11 ml     Weight: 65.3 kg (143 lb 15.4 oz)  GI/Nutrition:  Orders Placed This Encounter   Procedures   • DIET ORDER     Standing Status: Standing      Number of Occurrences: 1      Standing Expiration Date:      Order Specific Question:  Diet:     Answer:  Diabetic [3]     Medical Decision Making, by Problem:  Active Hospital Problems    Diagnosis   • Septic shock (CMS-HCC) [A41.9, R65.21]from UTI, requiring IV pressors, levophed. Initial lactic was 6. IV fluids. The empiric zosyn will be changed to  rocephin as E. Coli is sensitive.   • Acute renal failure (ARF) (CMS-HCC) [N17.9]initial Cr was 2.78 and now 1.43 with fluids. Check BMP tomorrow.    • UTI (urinary tract infection) [N39.0]   • Anemia, macrocytic [D53.9]   • Transaminitis [R74.0]CMP ordered for tomorrow   • Encephalopathy acute [G93.40]likely delirium from infection now improved.   • Tobacco dependence [F17.200]   • Diabetes mellitus type 2 in nonobese (CMS-HCC) [E11.9]sliding scale   • Chronic low back pain [M54.5, G89.29]on percocet 10 mg at home BID, Dr. Romero does acupuncture monthly.    • Hyperlipidemia [E78.5]   • Hypothyroidism [E03.9]replacement   Hypokalemia: replace orally, check K tomorrow.  Bipolar: RN will obtained home meds and they will be restarted.   Labs reviewed and Medications reviewed  Richardson catheter: Critically Ill - Requiring Accurate Measurement of Urinary Output  Central line in place: Sepsis    DVT Prophylaxis: Heparin

## 2017-04-18 LAB
ALBUMIN SERPL BCP-MCNC: 3.2 G/DL (ref 3.2–4.9)
ALBUMIN/GLOB SERPL: 1.1 G/DL
ALP SERPL-CCNC: 78 U/L (ref 30–99)
ALT SERPL-CCNC: 111 U/L (ref 2–50)
ANION GAP SERPL CALC-SCNC: 9 MMOL/L (ref 0–11.9)
AST SERPL-CCNC: 210 U/L (ref 12–45)
BILIRUB SERPL-MCNC: 0.4 MG/DL (ref 0.1–1.5)
BUN SERPL-MCNC: 10 MG/DL (ref 8–22)
CALCIUM SERPL-MCNC: 8.2 MG/DL (ref 8.5–10.5)
CHLORIDE SERPL-SCNC: 104 MMOL/L (ref 96–112)
CO2 SERPL-SCNC: 26 MMOL/L (ref 20–33)
CREAT SERPL-MCNC: 1.09 MG/DL (ref 0.5–1.4)
ERYTHROCYTE [DISTWIDTH] IN BLOOD BY AUTOMATED COUNT: 49.5 FL (ref 35.9–50)
GFR SERPL CREATININE-BSD FRML MDRD: 49 ML/MIN/1.73 M 2
GLOBULIN SER CALC-MCNC: 3 G/DL (ref 1.9–3.5)
GLUCOSE BLD-MCNC: 108 MG/DL (ref 65–99)
GLUCOSE BLD-MCNC: 115 MG/DL (ref 65–99)
GLUCOSE BLD-MCNC: 123 MG/DL (ref 65–99)
GLUCOSE BLD-MCNC: 99 MG/DL (ref 65–99)
GLUCOSE SERPL-MCNC: 93 MG/DL (ref 65–99)
HCT VFR BLD AUTO: 32.9 % (ref 37–47)
HGB BLD-MCNC: 11 G/DL (ref 12–16)
MCH RBC QN AUTO: 33.3 PG (ref 27–33)
MCHC RBC AUTO-ENTMCNC: 33.4 G/DL (ref 33.6–35)
MCV RBC AUTO: 99.7 FL (ref 81.4–97.8)
PLATELET # BLD AUTO: 159 K/UL (ref 164–446)
PMV BLD AUTO: 9.5 FL (ref 9–12.9)
POTASSIUM SERPL-SCNC: 3.3 MMOL/L (ref 3.6–5.5)
PROT SERPL-MCNC: 6.2 G/DL (ref 6–8.2)
RBC # BLD AUTO: 3.3 M/UL (ref 4.2–5.4)
SODIUM SERPL-SCNC: 139 MMOL/L (ref 135–145)
WBC # BLD AUTO: 5.9 K/UL (ref 4.8–10.8)

## 2017-04-18 PROCEDURE — 700105 HCHG RX REV CODE 258: Performed by: HOSPITALIST

## 2017-04-18 PROCEDURE — 700111 HCHG RX REV CODE 636 W/ 250 OVERRIDE (IP): Performed by: HOSPITALIST

## 2017-04-18 PROCEDURE — A9270 NON-COVERED ITEM OR SERVICE: HCPCS | Performed by: HOSPITALIST

## 2017-04-18 PROCEDURE — A9270 NON-COVERED ITEM OR SERVICE: HCPCS | Performed by: INTERNAL MEDICINE

## 2017-04-18 PROCEDURE — 700102 HCHG RX REV CODE 250 W/ 637 OVERRIDE(OP): Performed by: INTERNAL MEDICINE

## 2017-04-18 PROCEDURE — 770006 HCHG ROOM/CARE - MED/SURG/GYN SEMI*

## 2017-04-18 PROCEDURE — 700102 HCHG RX REV CODE 250 W/ 637 OVERRIDE(OP): Performed by: HOSPITALIST

## 2017-04-18 PROCEDURE — 85027 COMPLETE CBC AUTOMATED: CPT

## 2017-04-18 PROCEDURE — 700111 HCHG RX REV CODE 636 W/ 250 OVERRIDE (IP): Performed by: INTERNAL MEDICINE

## 2017-04-18 PROCEDURE — 82962 GLUCOSE BLOOD TEST: CPT | Mod: 91

## 2017-04-18 PROCEDURE — 80053 COMPREHEN METABOLIC PANEL: CPT

## 2017-04-18 PROCEDURE — 99232 SBSQ HOSP IP/OBS MODERATE 35: CPT | Performed by: HOSPITALIST

## 2017-04-18 RX ORDER — CITALOPRAM 20 MG/1
20 TABLET ORAL DAILY
Status: DISCONTINUED | OUTPATIENT
Start: 2017-04-18 | End: 2017-04-20 | Stop reason: HOSPADM

## 2017-04-18 RX ORDER — GABAPENTIN 100 MG/1
100 CAPSULE ORAL 2 TIMES DAILY
Status: DISCONTINUED | OUTPATIENT
Start: 2017-04-18 | End: 2017-04-20 | Stop reason: HOSPADM

## 2017-04-18 RX ORDER — POTASSIUM CHLORIDE 20 MEQ/1
40 TABLET, EXTENDED RELEASE ORAL ONCE
Status: COMPLETED | OUTPATIENT
Start: 2017-04-18 | End: 2017-04-18

## 2017-04-18 RX ADMIN — ACETAMINOPHEN 650 MG: 325 TABLET, FILM COATED ORAL at 08:25

## 2017-04-18 RX ADMIN — HEPARIN SODIUM 5000 UNITS: 5000 INJECTION, SOLUTION INTRAVENOUS; SUBCUTANEOUS at 14:04

## 2017-04-18 RX ADMIN — HEPARIN SODIUM 5000 UNITS: 5000 INJECTION, SOLUTION INTRAVENOUS; SUBCUTANEOUS at 06:04

## 2017-04-18 RX ADMIN — POTASSIUM CHLORIDE 40 MEQ: 1500 TABLET, EXTENDED RELEASE ORAL at 10:55

## 2017-04-18 RX ADMIN — TIZANIDINE 4 MG: 4 TABLET ORAL at 21:20

## 2017-04-18 RX ADMIN — GABAPENTIN 100 MG: 100 CAPSULE ORAL at 21:20

## 2017-04-18 RX ADMIN — POTASSIUM CHLORIDE 20 MEQ: 1500 TABLET, EXTENDED RELEASE ORAL at 21:20

## 2017-04-18 RX ADMIN — OXYCODONE HYDROCHLORIDE 5 MG: 5 TABLET ORAL at 21:29

## 2017-04-18 RX ADMIN — OXYCODONE HYDROCHLORIDE 5 MG: 5 TABLET ORAL at 15:23

## 2017-04-18 RX ADMIN — LAMOTRIGINE 100 MG: 100 TABLET ORAL at 21:20

## 2017-04-18 RX ADMIN — ROSUVASTATIN CALCIUM 20 MG: 10 TABLET ORAL at 08:25

## 2017-04-18 RX ADMIN — POTASSIUM CHLORIDE 20 MEQ: 1500 TABLET, EXTENDED RELEASE ORAL at 08:26

## 2017-04-18 RX ADMIN — OXYCODONE HYDROCHLORIDE 5 MG: 5 TABLET ORAL at 10:59

## 2017-04-18 RX ADMIN — ACETAMINOPHEN 650 MG: 325 TABLET, FILM COATED ORAL at 15:23

## 2017-04-18 RX ADMIN — CEFTRIAXONE SODIUM 2 G: 2 INJECTION, POWDER, FOR SOLUTION INTRAMUSCULAR; INTRAVENOUS at 08:25

## 2017-04-18 RX ADMIN — HEPARIN SODIUM 5000 UNITS: 5000 INJECTION, SOLUTION INTRAVENOUS; SUBCUTANEOUS at 21:23

## 2017-04-18 RX ADMIN — TIZANIDINE 4 MG: 4 TABLET ORAL at 08:26

## 2017-04-18 RX ADMIN — LEVOTHYROXINE SODIUM 25 MCG: 50 TABLET ORAL at 06:04

## 2017-04-18 RX ADMIN — CITALOPRAM HYDROBROMIDE 20 MG: 20 TABLET ORAL at 14:04

## 2017-04-18 ASSESSMENT — PAIN SCALES - GENERAL
PAINLEVEL_OUTOF10: 2
PAINLEVEL_OUTOF10: 5
PAINLEVEL_OUTOF10: 2
PAINLEVEL_OUTOF10: 3
PAINLEVEL_OUTOF10: 8
PAINLEVEL_OUTOF10: 3
PAINLEVEL_OUTOF10: 4
PAINLEVEL_OUTOF10: 3
PAINLEVEL_OUTOF10: 4
PAINLEVEL_OUTOF10: 2
PAINLEVEL_OUTOF10: 3

## 2017-04-18 ASSESSMENT — ENCOUNTER SYMPTOMS
CONSTITUTIONAL NEGATIVE: 1
INSOMNIA: 1
EYES NEGATIVE: 1
CHILLS: 0
RESPIRATORY NEGATIVE: 1
CARDIOVASCULAR NEGATIVE: 1
DIARRHEA: 0
NAUSEA: 0
NEUROLOGICAL NEGATIVE: 1
ROS GI COMMENTS: POOR APPETITE.
GASTROINTESTINAL NEGATIVE: 1
FEVER: 0
MUSCULOSKELETAL NEGATIVE: 1
VOMITING: 0

## 2017-04-18 NOTE — PROGRESS NOTES
NURSING REPORT SHEET    Patient Name: Alie Morris  Sex: Female  Age: 72 y.o.  Admitted: 4/15/2017  Room Number: T612/00  Code Status: Full Code    Providers: Hospitalist    Diagnosis  Septic shock (CMS-HCC)    Allergies   Allergen Reactions   • Ibuprofen Rash     Rash   • Penicillins Rash     Rash  Tolerated Zosyn 4/15/2017     Past Medical History   Diagnosis Date   • Hypertension    • Diabetes (CMS-HCC)    • Hyperlipidemia    • Thyroid disease    • Cancer (CMS-HCC)        Vital Signs    Heart Rate (Monitored): 73  NIBP: 109/48 mmHg  Respiration: 20  Pulse Oximetry: 99 %    Neurological     The patient is Alert and Oriented x 4, CAM-ICU is Negative currently PERRLA, cranial reflexes are intact, and strength is equal bilaterally.   Patient is having back pain intermittently, she has received her home dose of oxycodone, and it apparently caused her to become obtunded for previous nursing staff.     Hemodynamics    Heart Rate:  Rhythm: NSR CVP: NA  Pulses: radial R:1/L:1, dorsalis pedis R:1/L:1, posterior tibial R:1/L:1  Edema: Negative    Respiratory    Normal respiratory effort. Chest is clear    GI    Current Diet: DIET ORDER  Last BM: 4/17/17  normal bowel sounds          Intake/Output Summary (Last 24 hours) at 04/17/17 1852  Last data filed at 04/17/17 0200   Gross per 24 hour   Intake 1219.2 ml   Output    825 ml   Net  394.2 ml     Patient Lines/Drains/Airways Status    Active Catheter     Name: Placement date: Placement time: Site: Days:    Urinary Catheter Indwelling Catheter 16 04/15/17  1130  Indwelling Catheter  2                ID    TMax: 36.9 C  Current ABX: Rocephin    Lines    Right IJ TLC  Right AC 18g PIV    Drips    NaCl Locked    Skin    Intact    Labs    Lab Results   Component Value Date/Time    WBC 4.9 04/17/2017 03:13 AM    RBC 2.85* 04/17/2017 03:13 AM    HEMOGLOBIN 9.3* 04/17/2017 03:13 AM    HEMATOCRIT 28.9* 04/17/2017 03:13 AM    .4* 04/17/2017 03:13 AM    MCH 32.6  04/17/2017 03:13 AM    MCHC 32.2* 04/17/2017 03:13 AM    MPV 9.2 04/17/2017 03:13 AM      Lab Results   Component Value Date/Time    SODIUM 137 04/17/2017 03:13 AM    POTASSIUM 3.1* 04/17/2017 03:13 AM    CHLORIDE 104 04/17/2017 03:13 AM    CO2 26 04/17/2017 03:13 AM    GLUCOSE 110* 04/17/2017 03:13 AM    BUN 14 04/17/2017 03:13 AM    CREATININE 1.12 04/17/2017 03:13 AM     BLOOD CULTURE   Date Value Ref Range Status   04/15/2017   Preliminary    No Growth    Note: Blood cultures are incubated for 5 days and  are monitored continuously.Positive blood cultures  are called to the RN and reported as soon as  they are identified.          To Be Addressed    · Overall Mood  · Appears subdued, and overall disengaged from her care.  · Acute/Chronic Pain  · Heat/Cold Packs  · Oxycodone Sparingly  · We did 2.5 mg last night, did not seem to be optimal.  · Reposition to the chair, and in bed  · Impaired mobility  · Staggering  · Will get up to chair, and mobilize as able.  · Transfer Patient  · Will D/C CVC  · Will D/C Richardson Catheter

## 2017-04-18 NOTE — CARE PLAN
Problem: Venous Thromboembolism (VTW)/Deep Vein Thrombosis (DVT) Prevention:  Goal: Patient will participate in Venous Thrombosis (VTE)/Deep Vein Thrombosis (DVT)Prevention Measures  Pt has agreed to ambulate and is complying with VTE prophylaxis.      Problem: Urinary Elimination:  Goal: Ability to reestablish a normal urinary elimination pattern will improve  Outcome: PROGRESSING AS EXPECTED  Pt is voiding adequately following alarcon removal    Problem: Mobility  Goal: Risk for activity intolerance will decrease  Outcome: PROGRESSING AS EXPECTED  Pt has ambulated effectively and is able to turn self in bed

## 2017-04-18 NOTE — PROGRESS NOTES
Hospital Medicine Progress Note, Adult, Complex               Author: Tyron Lima Date & Time created: 4/18/2017  7:39 AM     Interval History:  Pt seen and evaluated in the ICU. Ms. Morris has a hx of DM and tobacco use that presented to the ER on 4/15 with weakness. She was found to have a UTI with a lactic acid of 6 and marked hypotension despite 6 liters of NS boluses. She has been admitted to the ICU for IV abx, fluids, and pressors.  She required levophed last night pressors but was turned off this morning at 02:00.   Patient seen and examined today. ICU Care  Care and plan discussed in IDT/Hot rounds.  Lines and assistive devices reviewed.    Patient tolerating treatment and therapies.  All Data, Medication data reviewed.  Case discussed with nursing as available.  Plan of Care reviewed with patient and notified of changes.  4/18 Pt feels much better, tolerating diet, ordered home meds, she is worried how to get home    Review of Systems:  Review of Systems   Constitutional: Negative.  Negative for fever and chills.   HENT: Negative.    Eyes: Negative.    Respiratory: Negative.    Cardiovascular: Negative.  Negative for chest pain and leg swelling.   Gastrointestinal: Negative.  Negative for nausea, vomiting and diarrhea.        Poor appetite.   Genitourinary: Negative.  Negative for dysuria.   Musculoskeletal: Negative.    Skin: Negative.    Neurological: Negative.    Endo/Heme/Allergies: Negative.    Psychiatric/Behavioral: The patient has insomnia.    All other systems reviewed and are negative.      Physical Exam:  Physical Exam   Constitutional: She is oriented to person, place, and time. She appears well-developed and well-nourished. No distress.   HENT:   Head: Normocephalic and atraumatic.   Nose: Nose normal.   Mouth/Throat: Oropharynx is clear and moist.   Eyes: Conjunctivae and EOM are normal. Pupils are equal, round, and reactive to light.   Neck: Normal range of motion. Neck supple. No JVD  present. No thyromegaly present.   Cardiovascular: Normal rate, regular rhythm and normal heart sounds.  Exam reveals no gallop and no friction rub.    No murmur heard.  Pulmonary/Chest: Effort normal and breath sounds normal. No respiratory distress. She has no wheezes. She has no rales.   Abdominal: Soft. Bowel sounds are normal. She exhibits no distension and no mass. There is no tenderness. There is no rebound and no guarding.   Musculoskeletal: Normal range of motion. She exhibits no edema or tenderness.   Lymphadenopathy:     She has no cervical adenopathy.   Neurological: She is alert and oriented to person, place, and time. No cranial nerve deficit.   Skin: Skin is warm and dry. She is not diaphoretic. There is pallor.   Psychiatric: She has a normal mood and affect. Her behavior is normal.   Nursing note and vitals reviewed.      Labs:        Invalid input(s): [id number]  Recent Labs      04/15/17   1125   TROPONINI  0.01     Recent Labs      04/16/17 0246 04/17/17 0313 04/18/17   0410   SODIUM  137  137  139   POTASSIUM  4.0  3.1*  3.3*   CHLORIDE  106  104  104   CO2  21  26  26   BUN  25*  14  10   CREATININE  1.43*  1.12  1.09   CALCIUM  7.0*  7.5*  8.2*     Recent Labs      04/15/17   1125 04/16/17   0246  04/17/17 0313  04/18/17   0410   ALTSGPT  120*   --   118*  111*   ASTSGOT  244*   --   284*  210*   ALKPHOSPHAT  66   --   73  78   TBILIRUBIN  0.5   --   0.4  0.4   GLUCOSE  83  110*  110*  93     Recent Labs      04/16/17   0246 04/17/17   0313  04/18/17   0410   RBC  3.39*  2.85*  3.30*   HEMOGLOBIN  10.9*  9.3*  11.0*   HEMATOCRIT  35.8*  28.9*  32.9*   PLATELETCT  134*  114*  159*     Recent Labs      04/15/17   1125  04/16/17   0246  04/17/17 0313  04/18/17   0410   WBC  8.2  9.2  4.9  5.9   NEUTSPOLYS  69.10  76.60*   --    --    LYMPHOCYTES  20.30*  13.70*   --    --    MONOCYTES  8.10  8.10   --    --    EOSINOPHILS  1.90  1.10   --    --    BASOPHILS  0.20  0.20   --    --     ASTSGOT  244*   --   284*  210*   ALTSGPT  120*   --   118*  111*   ALKPHOSPHAT  66   --   73  78   TBILIRUBIN  0.5   --   0.4  0.4           Hemodynamics:  Temp (24hrs), Av.8 °C (98.2 °F), Min:36.6 °C (97.8 °F), Max:36.9 °C (98.5 °F)  Temperature: 36.9 °C (98.5 °F)  Pulse  Av.2  Min: 56  Max: 103Heart Rate (Monitored): 83  NIBP: 107/50 mmHg    Respiratory:    Respiration: 19, Pulse Oximetry: 96 %        RUL Breath Sounds: Clear, RML Breath Sounds: Diminished, RLL Breath Sounds: Diminished, ROGER Breath Sounds: Clear, LLL Breath Sounds: Diminished  Fluids:    Intake/Output Summary (Last 24 hours) at 17 0739  Last data filed at 17 0000   Gross per 24 hour   Intake   1095 ml   Output   3000 ml   Net  -1905 ml     Weight: 62.2 kg (137 lb 2 oz)  GI/Nutrition:  Orders Placed This Encounter   Procedures   • DIET ORDER     Standing Status: Standing      Number of Occurrences: 1      Standing Expiration Date:      Order Specific Question:  Diet:     Answer:  Diabetic [3]     Medical Decision Making, by Problem:  Active Hospital Problems    Diagnosis   • Septic shock (CMS-HCC) [A41.9, R65.21 ] from UTI, requiring IV pressors, levophed. Initial lactic was 6. IV fluids. The empiric zosyn will be changed to rocephin as E. Coli is sensitive.   • Acute renal failure (ARF) (CMS-HCC) [N17.9 ]initial Cr was 2.78 and now is Nl   • UTI (urinary tract infection) [N39.0] on Tx   • Anemia, macrocytic [D53.9] h/o   • Transaminitis [R74.0]CMP ordered for tomorrow   • Encephalopathy acute [G93.40]likely delirium from infection now improved.   • Tobacco dependence [F17.200]   • Diabetes mellitus type 2 in nonobese (CMS-HCC) [E11.9]sliding scale   • Chronic low back pain [M54.5, G89.29]on percocet 10 mg at home BID, Dr. Romero does acupuncture monthly.    • Hyperlipidemia [E78.5]   • Hypothyroidism [E03.9]replacement   -Hypokalemia: replace orally, again  -Bipolar: RN will obtained home meds and they will be restarted.    - elevated LFT's, will f/u in am  Plan  ok for medical  C/w abx for 5 -7 days  Restarted home meds  See orders  Recheck am labs    Labs reviewed and Medications reviewed  Richardson catheter: Critically Ill - Requiring Accurate Measurement of Urinary Output  Central line in place: Sepsis    DVT Prophylaxis: Heparin

## 2017-04-19 VITALS
SYSTOLIC BLOOD PRESSURE: 119 MMHG | WEIGHT: 137.13 LBS | TEMPERATURE: 98 F | OXYGEN SATURATION: 93 % | BODY MASS INDEX: 25.89 KG/M2 | DIASTOLIC BLOOD PRESSURE: 41 MMHG | HEIGHT: 61 IN | RESPIRATION RATE: 18 BRPM | HEART RATE: 60 BPM

## 2017-04-19 LAB
ALBUMIN SERPL BCP-MCNC: 3.4 G/DL (ref 3.2–4.9)
ALBUMIN/GLOB SERPL: 1.1 G/DL
ALP SERPL-CCNC: 81 U/L (ref 30–99)
ALT SERPL-CCNC: 86 U/L (ref 2–50)
ANION GAP SERPL CALC-SCNC: 11 MMOL/L (ref 0–11.9)
AST SERPL-CCNC: 126 U/L (ref 12–45)
BILIRUB SERPL-MCNC: 0.3 MG/DL (ref 0.1–1.5)
BUN SERPL-MCNC: 15 MG/DL (ref 8–22)
CALCIUM SERPL-MCNC: 9 MG/DL (ref 8.5–10.5)
CHLORIDE SERPL-SCNC: 105 MMOL/L (ref 96–112)
CO2 SERPL-SCNC: 25 MMOL/L (ref 20–33)
CREAT SERPL-MCNC: 1.11 MG/DL (ref 0.5–1.4)
GFR SERPL CREATININE-BSD FRML MDRD: 48 ML/MIN/1.73 M 2
GLOBULIN SER CALC-MCNC: 3.1 G/DL (ref 1.9–3.5)
GLUCOSE BLD-MCNC: 111 MG/DL (ref 65–99)
GLUCOSE BLD-MCNC: 145 MG/DL (ref 65–99)
GLUCOSE SERPL-MCNC: 153 MG/DL (ref 65–99)
POTASSIUM SERPL-SCNC: 4.2 MMOL/L (ref 3.6–5.5)
PROT SERPL-MCNC: 6.5 G/DL (ref 6–8.2)
SODIUM SERPL-SCNC: 141 MMOL/L (ref 135–145)

## 2017-04-19 PROCEDURE — A9270 NON-COVERED ITEM OR SERVICE: HCPCS | Performed by: HOSPITALIST

## 2017-04-19 PROCEDURE — 80053 COMPREHEN METABOLIC PANEL: CPT

## 2017-04-19 PROCEDURE — 700105 HCHG RX REV CODE 258: Performed by: HOSPITALIST

## 2017-04-19 PROCEDURE — 700111 HCHG RX REV CODE 636 W/ 250 OVERRIDE (IP): Performed by: INTERNAL MEDICINE

## 2017-04-19 PROCEDURE — 700102 HCHG RX REV CODE 250 W/ 637 OVERRIDE(OP): Performed by: HOSPITALIST

## 2017-04-19 PROCEDURE — 700111 HCHG RX REV CODE 636 W/ 250 OVERRIDE (IP): Performed by: HOSPITALIST

## 2017-04-19 PROCEDURE — 700102 HCHG RX REV CODE 250 W/ 637 OVERRIDE(OP): Performed by: INTERNAL MEDICINE

## 2017-04-19 PROCEDURE — 99239 HOSP IP/OBS DSCHRG MGMT >30: CPT | Performed by: HOSPITALIST

## 2017-04-19 PROCEDURE — 82962 GLUCOSE BLOOD TEST: CPT

## 2017-04-19 PROCEDURE — A9270 NON-COVERED ITEM OR SERVICE: HCPCS | Performed by: INTERNAL MEDICINE

## 2017-04-19 RX ORDER — CEFUROXIME AXETIL 500 MG/1
500 TABLET ORAL EVERY 12 HOURS
Qty: 8 TAB | Refills: 0 | Status: SHIPPED | OUTPATIENT
Start: 2017-04-19 | End: 2017-04-23

## 2017-04-19 RX ORDER — CEFUROXIME AXETIL 500 MG/1
500 TABLET ORAL EVERY 12 HOURS
Status: DISCONTINUED | OUTPATIENT
Start: 2017-04-19 | End: 2017-04-20 | Stop reason: HOSPADM

## 2017-04-19 RX ADMIN — LEVOTHYROXINE SODIUM 25 MCG: 50 TABLET ORAL at 05:42

## 2017-04-19 RX ADMIN — OXYCODONE HYDROCHLORIDE 5 MG: 5 TABLET ORAL at 03:26

## 2017-04-19 RX ADMIN — TIZANIDINE 4 MG: 4 TABLET ORAL at 08:16

## 2017-04-19 RX ADMIN — ACETAMINOPHEN 650 MG: 325 TABLET, FILM COATED ORAL at 03:26

## 2017-04-19 RX ADMIN — POTASSIUM CHLORIDE 20 MEQ: 1500 TABLET, EXTENDED RELEASE ORAL at 08:15

## 2017-04-19 RX ADMIN — GABAPENTIN 100 MG: 100 CAPSULE ORAL at 08:15

## 2017-04-19 RX ADMIN — ACETAMINOPHEN 650 MG: 325 TABLET, FILM COATED ORAL at 08:15

## 2017-04-19 RX ADMIN — ROSUVASTATIN CALCIUM 20 MG: 10 TABLET ORAL at 08:15

## 2017-04-19 RX ADMIN — CEFUROXIME AXETIL 500 MG: 500 TABLET ORAL at 10:48

## 2017-04-19 RX ADMIN — CITALOPRAM HYDROBROMIDE 20 MG: 20 TABLET ORAL at 08:15

## 2017-04-19 RX ADMIN — STANDARDIZED SENNA CONCENTRATE AND DOCUSATE SODIUM 2 TABLET: 8.6; 5 TABLET, FILM COATED ORAL at 08:15

## 2017-04-19 RX ADMIN — OXYCODONE HYDROCHLORIDE 10 MG: 10 TABLET ORAL at 17:39

## 2017-04-19 RX ADMIN — OXYCODONE HYDROCHLORIDE 10 MG: 10 TABLET ORAL at 08:15

## 2017-04-19 RX ADMIN — HEPARIN SODIUM 5000 UNITS: 5000 INJECTION, SOLUTION INTRAVENOUS; SUBCUTANEOUS at 05:41

## 2017-04-19 RX ADMIN — ACETAMINOPHEN 650 MG: 325 TABLET, FILM COATED ORAL at 17:39

## 2017-04-19 ASSESSMENT — ENCOUNTER SYMPTOMS
INSOMNIA: 1
CARDIOVASCULAR NEGATIVE: 1
VOMITING: 0
NEUROLOGICAL NEGATIVE: 1
DIARRHEA: 0
FEVER: 0
MUSCULOSKELETAL NEGATIVE: 1
GASTROINTESTINAL NEGATIVE: 1
NAUSEA: 0
ROS GI COMMENTS: POOR APPETITE.
RESPIRATORY NEGATIVE: 1
CHILLS: 0
CONSTITUTIONAL NEGATIVE: 1
EYES NEGATIVE: 1

## 2017-04-19 ASSESSMENT — PAIN SCALES - GENERAL
PAINLEVEL_OUTOF10: 6
PAINLEVEL_OUTOF10: 8
PAINLEVEL_OUTOF10: 9
PAINLEVEL_OUTOF10: 5
PAINLEVEL_OUTOF10: 6
PAINLEVEL_OUTOF10: 5
PAINLEVEL_OUTOF10: 7

## 2017-04-19 NOTE — CARE PLAN
Problem: Safety  Goal: Will remain free from injury  Pt has call light and calls  Intervention: Provide assistance with mobility  Pt calls appropriately to get up and is SBA      Problem: Skin Integrity  Goal: Risk for impaired skin integrity will decrease  Intervention: Assess risk factors for impaired skin integrity and/or pressure ulcers  Pt up walking and moves self in bed  Intervention: Implement precautions to protect skin integrity in collaboration with the interdisciplinary team  Mobilization utilized

## 2017-04-19 NOTE — DISCHARGE PLANNING
Transportation request received for patient to be transported home. Information sent to [facility] transport, call placed to Fer in transport. Time arranged for 1800. Job number [id number], Sw Don notified via phone.

## 2017-04-19 NOTE — PROGRESS NOTES
Hospital Medicine Progress Note, Adult, Complex               Author: Tyron Lima Date & Time created: 4/19/2017  8:36 AM     Interval History:  Pt seen and evaluated in the ICU. Ms. Morris has a hx of DM and tobacco use that presented to the ER on 4/15 with weakness. She was found to have a UTI with a lactic acid of 6 and marked hypotension despite 6 liters of NS boluses. She has been admitted to the ICU for IV abx, fluids, and pressors.    Patient seen and examined today. ICU Care  Care and plan discussed in IDT/Hot rounds.  Lines and assistive devices reviewed.    Patient tolerating treatment and therapies.  All Data, Medication data reviewed.  Case discussed with nursing as available.  Plan of Care reviewed with patient and notified of changes.  4/18 Pt feels much better, tolerating diet, ordered home meds, she is worried how to get home  4/19 Pt feels better, ok for D/c, still in ICU as overflow, no further Sx's, changes to oral Abx and tolerated    Review of Systems:  Review of Systems   Constitutional: Negative.  Negative for fever and chills.   HENT: Negative.    Eyes: Negative.    Respiratory: Negative.    Cardiovascular: Negative.  Negative for chest pain and leg swelling.   Gastrointestinal: Negative.  Negative for nausea, vomiting and diarrhea.        Poor appetite.   Genitourinary: Negative.  Negative for dysuria.   Musculoskeletal: Negative.    Skin: Negative.    Neurological: Negative.    Endo/Heme/Allergies: Negative.    Psychiatric/Behavioral: The patient has insomnia.    All other systems reviewed and are negative.      Physical Exam:  Physical Exam   Constitutional: She is oriented to person, place, and time. She appears well-developed and well-nourished. No distress.   HENT:   Head: Normocephalic and atraumatic.   Nose: Nose normal.   Mouth/Throat: Oropharynx is clear and moist.   Eyes: Conjunctivae and EOM are normal. Pupils are equal, round, and reactive to light.   Neck: Normal range of  motion. Neck supple. No JVD present. No thyromegaly present.   Cardiovascular: Normal rate, regular rhythm and normal heart sounds.  Exam reveals no gallop and no friction rub.    No murmur heard.  Pulmonary/Chest: Effort normal and breath sounds normal. No respiratory distress. She has no wheezes. She has no rales.   Abdominal: Soft. Bowel sounds are normal. She exhibits no distension and no mass. There is no tenderness. There is no rebound and no guarding.   Musculoskeletal: Normal range of motion. She exhibits no edema or tenderness.   Lymphadenopathy:     She has no cervical adenopathy.   Neurological: She is alert and oriented to person, place, and time. No cranial nerve deficit.   Skin: Skin is warm and dry. She is not diaphoretic. There is pallor.   Psychiatric: She has a normal mood and affect. Her behavior is normal.   Nursing note and vitals reviewed.      Labs:        Invalid input(s): [id number]      Recent Labs      17   0410   SODIUM  137  139   POTASSIUM  3.1*  3.3*   CHLORIDE  104  104   CO2  26  26   BUN  14  10   CREATININE  1.12  1.09   CALCIUM  7.5*  8.2*     Recent Labs      173  17   0410   ALTSGPT  118*  111*   ASTSGOT  284*  210*   ALKPHOSPHAT  73  78   TBILIRUBIN  0.4  0.4   GLUCOSE  110*  93     Recent Labs      17   0410   RBC  2.85*  3.30*   HEMOGLOBIN  9.3*  11.0*   HEMATOCRIT  28.9*  32.9*   PLATELETCT  114*  159*     Recent Labs      17   0410   WBC  4.9  5.9   ASTSGOT  284*  210*   ALTSGPT  118*  111*   ALKPHOSPHAT  73  78   TBILIRUBIN  0.4  0.4           Hemodynamics:  Temp (24hrs), Av.8 °C (98.3 °F), Min:36.7 °C (98 °F), Max:36.9 °C (98.5 °F)  Temperature: 36.7 °C (98 °F)  Pulse  Av.4  Min: 56  Max: 103   Blood Pressure : 119/41 mmHg, NIBP: 107/43 mmHg    Respiratory:    Respiration: 18, Pulse Oximetry: 93 %        RUL Breath Sounds: Clear, RML Breath Sounds: Clear, RLL Breath Sounds:  Diminished, ROGER Breath Sounds: Clear, LLL Breath Sounds: Diminished  Fluids:    Intake/Output Summary (Last 24 hours) at 04/19/17 0836  Last data filed at 04/19/17 0800   Gross per 24 hour   Intake    480 ml   Output    800 ml   Net   -320 ml        GI/Nutrition:  Orders Placed This Encounter   Procedures   • DIET ORDER     Standing Status: Standing      Number of Occurrences: 1      Standing Expiration Date:      Order Specific Question:  Diet:     Answer:  Diabetic [3]     Medical Decision Making, by Problem:  Active Hospital Problems    Diagnosis   • Septic shock (CMS-HCC) [A41.9, R65.21 ] from UTI, required IV pressors, levophed. Initial lactic was 6. IV fluids. E. Coli is sensitive.   • Acute renal failure (ARF) (CMS-HCC) [N17.9 ]initial Cr was 2.78 and now is Nl   • UTI (urinary tract infection) [N39.0] on Tx   • Anemia, macrocytic [D53.9] h/o   • Transaminitis [R74.0]CMP ordered for tomorrow   • Encephalopathy acute [G93.40]likely delirium from infection now improved.   • Tobacco dependence [F17.200]   • Diabetes mellitus type 2 in nonobese (CMS-HCC) [E11.9]sliding scale   • Chronic low back pain [M54.5, G89.29]on percocet 10 mg at home BID, Dr. Romero does acupuncture monthly.    • Hyperlipidemia [E78.5]   • Hypothyroidism [E03.9]replacement   - Hypokalemia: replace orally, again  - Bipolar: RN will obtained home meds and  restarted.   - elevated LFT's, will f/u in am  Plan  Ok for d/c  See d/c summary    Labs reviewed and Medications reviewed  Richardson catheter: Critically Ill - Requiring Accurate Measurement of Urinary Output  Central line in place: Sepsis    DVT Prophylaxis: Heparin

## 2017-04-19 NOTE — DISCHARGE INSTRUCTIONS
Discharge Instructions    Discharged to home by taxi with self. Discharged via wheelchair, hospital escort: Yes.  Special equipment needed: Not Applicable    Be sure to schedule a follow-up appointment with your primary care doctor or any specialists within 5 days.     Discharge Plan:   Smoking Cessation Offered: Patient Refused  Influenza Vaccine Indication: Not indicated: Previously immunized this influenza season and > 8 years of age    I understand that a diet low in cholesterol, fat, and sodium is recommended for good health. Unless I have been given specific instructions below for another diet, I accept this instruction as my diet prescription.   Other diet: regular diabetic    Special Instructions: None    · Is patient discharged on Warfarin / Coumadin?   No     · Is patient Post Blood Transfusion?  No    Depression / Suicide Risk    As you are discharged from this [facility], it is important to learn how to keep safe from harming yourself.    Recognize the warning signs:  · Abrupt changes in personality, positive or negative- including increase in energy   · Giving away possessions  · Change in eating patterns- significant weight changes-  positive or negative  · Change in sleeping patterns- unable to sleep or sleeping all the time   · Unwillingness or inability to communicate  · Depression  · Unusual sadness, discouragement and loneliness  · Talk of wanting to die  · Neglect of personal appearance   · Rebelliousness- reckless behavior  · Withdrawal from people/activities they love  · Confusion- inability to concentrate     If you or a loved one observes any of these behaviors or has concerns about self-harm, here's what you can do:  · Talk about it- your feelings and reasons for harming yourself  · Remove any means that you might use to hurt yourself (examples: pills, rope, extension cords, firearm)  · Get professional help from the community (Mental Health, Substance Abuse, psychological  counseling)  · Do not be alone:Call your Safe Contact- someone whom you trust who will be there for you.  · Call your local CRISIS HOTLINE 425-3434 or 450-111-6296  · Call your local Children's Mobile Crisis Response Team [place] (428) 244-2666 or www.Wavii  · Call the toll free National Suicide Prevention Hotlines   · National Suicide Prevention Lifeline 502-893-XNXL (3097)  · National Hope Line Network 800-SUICIDE (567-5716)

## 2017-04-19 NOTE — DISCHARGE PLANNING
Request received to assist this patient going home experiencing transportation hardship.  Patient unable to contact her roommates.  No local family.  Patient does not have her purse and no insurance coverage for transportation.     Completed transport form and faxed to [facility].  Requesting [facility] transport to support no delay in [place].   Current [place].  Request time 1500

## 2017-04-19 NOTE — PROGRESS NOTES
Bedside report received from SOFY Stewart. POC discussed with pt; all questions answered at this time. White board updated. Medical orders confirmed. All needs met at this time.

## 2017-04-20 ENCOUNTER — PATIENT OUTREACH (OUTPATIENT)
Dept: HEALTH INFORMATION MANAGEMENT | Facility: OTHER | Age: 72
End: 2017-04-20

## 2017-04-20 LAB
BACTERIA BLD CULT: NORMAL
BACTERIA BLD CULT: NORMAL
SIGNIFICANT IND 70042: NORMAL
SIGNIFICANT IND 70042: NORMAL
SITE SITE: NORMAL
SITE SITE: NORMAL
SOURCE SOURCE: NORMAL
SOURCE SOURCE: NORMAL

## 2017-04-20 NOTE — PROGRESS NOTES
Went over discharge paperwork. Answered all questions. Picked up home medication from pharmacy that was being held. Pt changed into personal clothes. Pt has all belongings and medications. Spoke with pt about importance of seeing her primary care physician as soon as possible. All lines removed. Awaiting transport to take her home.

## 2017-04-20 NOTE — DISCHARGE SUMMARY
DATE OF ADMISSION:  04/15/2017    DATE OF DISCHARGE:  04/19/2017    DISCHARGE DISPOSITION:  To home.    DISCHARGE CONDITION:  Medically stable and improved.    DISCHARGE DIAGNOSES:  1.  Status post sepsis related to urinary source.  2.  Urinary tract infection with Escherichia coli, which is broadly sensitive.  3.  Status post acute kidney injury, resolved with hydration.  4.  Microcytic anemia history.  5.  Transient transaminitis, which has improved with restoration of   normotensive status.  6.  Status post encephalopathy related to acute illness, this also resolved.  7.  Tobacco dependence.  8.  Diabetes type 2.  9.  Chronic back pain where the patient has an outpatient treating pain   physician.  10.  Dyslipidemia.  11.  Hyperthyroidism.  12.  Transient hypokalemia.  13.  Bipolar disorder.  14.  Abnormal liver function tests again secondary to hypoperfusion, this is   improved.  15.  History of abdominal hysterectomy.  16.  History of arthroplasty.  17.  History of cholecystectomy.    DISCHARGE MEDICATIONS:  As follow:  1.  Ceftin 500 mg p.o. b.i.d. x4 more days.  2.  Celexa 20 mg daily.  3.  Clonazepam 0.5 mg p.o. b.i.d.  4.  Neurontin 100 mg 1-2 tablets b.i.d.  5.  Hydrochlorothiazide 25 mg daily.  6.  Lamictal 100 mg p.o. daily.  7.  Levothyroxine 25 mcg daily.  8.  Lisinopril 2.5 mg daily.  9.  Metformin 500 mg p.o. b.i.d.  10.  Percocet 10/325 one tablet 4 times daily p.r.n. pain.  11.  Phenergan 25 mg q. 4 hours p.r.n. nausea, vomiting.  12.  Crestor 20 mg daily.  13.  Zanaflex 4 mg q. 6 p.r.n. spasms.    For presenting symptoms, HPI, and physical findings, refer to the dictated H   and P.    HOSPITAL COURSE:  The patient was admitted after a fall.  She became altered   and lethargic.  The patient was found with sepsis from a urinary tract   infection.  She was volume resuscitated requiring to sepsis protocol.  She was   pancultured and had a positive urine with urine culture showing E. coli,   which was  sensitive to multiple agents.  The patient was volume resuscitated   and her medication regimen was slowly restarted once the patient felt better.    She had no further difficulties with blood pressure and at this time as she   has been transitioned to oral medication only, she is able to be discharged   home with close outpatient followup with primary care physician, Dr. Chin   and associates for ongoing followup.  The patient is instructed to hydrate   well.  She is instructed to take medication as prescribed.  Call her physician   or 911 if she has new or worsening difficulties.    LABORATORY DATA AND IMAGING:  In brief prior to discharge as follows:  White   cell count 5.9, hemoglobin 11, hematocrit 32.9, and MCV of 99.7 and a platelet   count of 159.  Chemistry today abnormal for glucose of 153.  Her LFTs are   slowly improving down to AST of 126 and ALT of 86 at this time.  For full   further details, refer to the computer system and paper chart.    DISCHARGE DIET:  As previously diabetic.    Time spent on discharge is 45 minutes.       ____________________________________     MD REINALDO VÁZQUEZ / NTS    DD:  04/19/2017 12:23:28  DT:  04/19/2017 23:08:21    D#:  [id number]  Job#:  [id number]    cc: Arminda Chin MD

## 2017-04-26 ENCOUNTER — TELEPHONE (OUTPATIENT)
Dept: MEDICAL GROUP | Facility: MEDICAL CENTER | Age: 72
End: 2017-04-26

## 2017-05-01 ENCOUNTER — APPOINTMENT (OUTPATIENT)
Dept: MEDICAL GROUP | Facility: MEDICAL CENTER | Age: 72
End: 2017-05-01
Payer: MEDICARE

## 2017-05-04 NOTE — TELEPHONE ENCOUNTER
Future Appointments       Provider Department Center    5/5/2017 2:30 PM LAB [facility] - [facility]     5/5/2017 4:00 PM Arminda Chin M.D. [facility] [address]          ESTABLISHED PATIENT PRE-VISIT PLANNING     Note: Patient will not be contacted if there is no indication to call. PT was not Contacted.    1.    Reviewed note from last office visit with PCP: YES Last office visit: 01/09*17    2.  If any orders were placed at last visit, do we have Results/Consult Notes?        •  Labs - Labs ordered, completed and results are in chart. 04/19/17       •  Imaging - Imaging ordered, completed and results are in chart. 04/15/17 DX Chest       •  Referrals - No referrals were ordered at last office visit.     3.  Immunizations were updated in Epic using WebIZ?: Epic matches WebIZ       •  Web Iz Recommendations: HEPATITIS A  HEPATITIS B TDAP ZOSTAVAX (Shingles)    4.  Patient is due for the following Health Maintenance Topics:   Health Maintenance Due   Topic Date Due   • Annual Wellness Visit  NEEDS TO SCHEDULE   • DIABETES MONOFILAMENT / LE EXAM  DUE   • RETINAL SCREENING  DUE   • URINE ACR / MICROALBUMIN  DUE   • BONE DENSITY  DUE   • MAMMOGRAM  DUE   • COLONOSCOPY  DUE   • IMM ZOSTER VACCINE  NEEDS SCRIPT   • IMM DTaP/Tdap/Td Vaccine (1 - Tdap) NEEDS SCRIPT       5.  Patient was not informed to arrive 15 min prior to their scheduled appointment and bring in their medication bottles.

## 2017-05-08 RX ORDER — LEVOTHYROXINE SODIUM 0.05 MG/1
50 TABLET ORAL DAILY
Qty: 90 TAB | Refills: 1 | Status: SHIPPED | OUTPATIENT
Start: 2017-05-08 | End: 2017-06-05

## 2017-05-18 ENCOUNTER — TELEPHONE (OUTPATIENT)
Dept: MEDICAL GROUP | Facility: MEDICAL CENTER | Age: 72
End: 2017-05-18

## 2017-05-18 NOTE — TELEPHONE ENCOUNTER
Future Appointments       Provider Department Center    5/25/2017 3:40 PM Arminda Chin M.D. [facility] [address]          ESTABLISHED PATIENT PRE-VISIT PLANNING     Note: Patient will not be contacted if there is no indication to call. PT was not Contacted.    1.    Reviewed note from last office visit with PCP: YES Last office visit: 01/09/17    2.  If any orders were placed at last visit, do we have Results/Consult Notes?        •  Labs - Labs ordered, completed and results are in chart. 04/19/17       •  Imaging - Imaging ordered, completed and results are in chart. 04/15/17       •  Referrals - No referrals were ordered at last office visit.     3.  Immunizations were updated in [employer] using WebIZ?: Yes       •  Web Iz Recommendations: HEPATITIS A  HEPATITIS B TDAP VARICELLA (Chicken Pox)  ZOSTAVAX (Shingles)    4.  Patient is due for the following Health Maintenance Topics:   Health Maintenance Due   Topic Date Due   • Annual Wellness Visit  1945   • DIABETES MONOFILAMENT / LE EXAM  1945   • RETINAL SCREENING  03/25/1963   • URINE ACR / MICROALBUMIN  03/25/1963   • IMM DTaP/Tdap/Td Vaccine (1 - Tdap) 03/25/1964   • MAMMOGRAM  03/25/1985   • COLONOSCOPY  03/25/1995   • IMM ZOSTER VACCINE  03/25/2005   • BONE DENSITY  03/25/2010       5.  Patient was not informed to arrive 15 min prior to their scheduled appointment and bring in their medication bottles.

## 2017-05-25 ENCOUNTER — OFFICE VISIT (OUTPATIENT)
Dept: MEDICAL GROUP | Facility: MEDICAL CENTER | Age: 72
End: 2017-05-25
Payer: MEDICARE

## 2017-05-25 VITALS
HEART RATE: 89 BPM | WEIGHT: 148.37 LBS | DIASTOLIC BLOOD PRESSURE: 78 MMHG | BODY MASS INDEX: 28.01 KG/M2 | RESPIRATION RATE: 14 BRPM | SYSTOLIC BLOOD PRESSURE: 130 MMHG | TEMPERATURE: 99.7 F | HEIGHT: 61 IN | OXYGEN SATURATION: 95 %

## 2017-05-25 DIAGNOSIS — E11.9 DIABETES MELLITUS TYPE 2 IN NONOBESE (HCC): ICD-10-CM

## 2017-05-25 DIAGNOSIS — G89.29 CHRONIC BILATERAL LOW BACK PAIN WITH SCIATICA, SCIATICA LATERALITY UNSPECIFIED: ICD-10-CM

## 2017-05-25 DIAGNOSIS — E78.5 HYPERLIPIDEMIA, UNSPECIFIED HYPERLIPIDEMIA TYPE: ICD-10-CM

## 2017-05-25 DIAGNOSIS — R74.8 LIVER ENZYME ELEVATION: ICD-10-CM

## 2017-05-25 DIAGNOSIS — N18.3 CKD (CHRONIC KIDNEY DISEASE), STAGE 3 (MODERATE): ICD-10-CM

## 2017-05-25 DIAGNOSIS — Z00.00 HEALTH CARE MAINTENANCE: ICD-10-CM

## 2017-05-25 DIAGNOSIS — I10 ESSENTIAL HYPERTENSION: ICD-10-CM

## 2017-05-25 DIAGNOSIS — M54.40 CHRONIC BILATERAL LOW BACK PAIN WITH SCIATICA, SCIATICA LATERALITY UNSPECIFIED: ICD-10-CM

## 2017-05-25 DIAGNOSIS — F31.9 BIPOLAR DISORDER WITH DEPRESSION (HCC): ICD-10-CM

## 2017-05-25 DIAGNOSIS — E03.9 HYPOTHYROIDISM, UNSPECIFIED TYPE: ICD-10-CM

## 2017-05-25 PROCEDURE — 1100F PTFALLS ASSESS-DOCD GE2>/YR: CPT | Performed by: FAMILY MEDICINE

## 2017-05-25 PROCEDURE — 4040F PNEUMOC VAC/ADMIN/RCVD: CPT | Performed by: FAMILY MEDICINE

## 2017-05-25 PROCEDURE — 3014F SCREEN MAMMO DOC REV: CPT | Mod: 8P | Performed by: FAMILY MEDICINE

## 2017-05-25 PROCEDURE — 3017F COLORECTAL CA SCREEN DOC REV: CPT | Performed by: FAMILY MEDICINE

## 2017-05-25 PROCEDURE — 3288F FALL RISK ASSESSMENT DOCD: CPT | Mod: 8P | Performed by: FAMILY MEDICINE

## 2017-05-25 PROCEDURE — 1036F TOBACCO NON-USER: CPT | Performed by: FAMILY MEDICINE

## 2017-05-25 PROCEDURE — G8417 CALC BMI ABV UP PARAM F/U: HCPCS | Performed by: FAMILY MEDICINE

## 2017-05-25 PROCEDURE — 3044F HG A1C LEVEL LT 7.0%: CPT | Performed by: FAMILY MEDICINE

## 2017-05-25 PROCEDURE — 99214 OFFICE O/P EST MOD 30 MIN: CPT | Mod: 25 | Performed by: FAMILY MEDICINE

## 2017-05-25 PROCEDURE — 0518F FALL PLAN OF CARE DOCD: CPT | Mod: 8P | Performed by: FAMILY MEDICINE

## 2017-05-25 RX ORDER — DIAZEPAM 2 MG/1
2 TABLET ORAL EVERY 6 HOURS PRN
COMMUNITY
End: 2017-06-07

## 2017-05-25 RX ORDER — SUMATRIPTAN 25 MG/1
25-100 TABLET, FILM COATED ORAL
COMMUNITY

## 2017-05-25 RX ORDER — DOXEPIN HYDROCHLORIDE 50 MG/1
50 CAPSULE ORAL NIGHTLY
COMMUNITY

## 2017-05-25 RX ORDER — DULOXETIN HYDROCHLORIDE 20 MG/1
20 CAPSULE, DELAYED RELEASE ORAL DAILY
COMMUNITY

## 2017-05-25 RX ORDER — TRAZODONE HYDROCHLORIDE 50 MG/1
50 TABLET ORAL NIGHTLY
COMMUNITY

## 2017-05-25 NOTE — Clinical Note
[facility]  Arminda Chin M.D.  [address]  [place] [address]  Fax: [phone]   Authorization for Release/Disclosure of   Protected Health Information   Name: ALIE CHÁVEZ : 1945 SSN: [id number]   Address: [address] [place] [address]  [place] [address] Phone:    [phone] (home)    I authorize the entity listed below to release/disclose the PHI below to:   [facility]/Arminda Chin M.D. and Arminda Chin M.D.   Provider or Entity Name:  ANA LILIA OCHOA   Address   [facility], [facility], [facility]   Phone:  [phone]    Fax:  [phone]   Reason for request: continuity of care   Information to be released:    [  ] LAST COLONOSCOPY,  including any PATH REPORT and follow-up  [  ] LAST FIT/COLOGUARD RESULT [  ] LAST DEXA  [  ] LAST MAMMOGRAM  [  ] LAST PAP  [  ] LAST LABS [  ] RETINA EXAM REPORT  [  ] IMMUNIZATION RECORDS  [  ] Release all info      [  ] Check here and initial the line next to each item to release ALL health information INCLUDING  _____ Care and treatment for drug and / or alcohol abuse  _____ HIV testing, infection status, or AIDS  _____ Genetic Testing    DATES OF SERVICE OR TIME PERIOD TO BE DISCLOSED: _____________  I understand and acknowledge that:  * This Authorization may be revoked at any time by you in writing, except if your health information has already been used or disclosed.  * Your health information that will be used or disclosed as a result of you signing this authorization could be re-disclosed by the recipient. If this occurs, your re-disclosed health information may no longer be protected by State or Federal laws.  * You may refuse to sign this Authorization. Your refusal will not affect your ability to obtain treatment.  * This Authorization becomes effective upon signing and will  on (date) __________.      If no date is indicated, this Authorization will  one (1) year from the signature date.    Name: Alie  Arturo    Signature:   Date:     5/25/2017       PLEASE FAX REQUESTED RECORDS BACK TO: [phone]

## 2017-05-25 NOTE — MR AVS SNAPSHOT
"        Alie Morris   2017 3:40 PM   Office Visit   MRN: [id number]    Department:  [address] [place] [address]   Dept Phone:  [phone]    Description:  Female : 1945   Provider:  Arminda Chin M.D.           Reason for Visit     Establish Care Diabetes      Allergies as of 2017     Allergen Noted Reactions    Ibuprofen 04/15/2017   Rash    Rash    Penicillins 04/15/2017   Rash    Rash  Tolerated Zosyn 4/15/2017      You were diagnosed with     Diabetes mellitus type 2 in nonobese (CMS-HCC)   [246544]       Essential hypertension   [8668246]       Hyperlipidemia, unspecified hyperlipidemia type   [0072196]       Hypothyroidism, unspecified type   [8730242]       Bipolar disorder with depression (CMS-HCC)   [235781]       Chronic bilateral low back pain with sciatica, sciatica laterality unspecified   [0182388]       Health care maintenance   [950738]       CKD (chronic kidney disease), stage 3 (moderate)   [3528898]       Liver enzyme elevation   [826787]         Vital Signs     Blood Pressure Pulse Temperature Respirations Height Weight    130/78 mmHg 89 37.6 °C (99.7 °F) 14 1.544 m (5' 0.79\") 67.3 kg (148 lb 5.9 oz)    Body Mass Index Oxygen Saturation Smoking Status             28.23 kg/m2 95% Former Smoker         Basic Information     Date Of Birth Sex Race Ethnicity Preferred Language    1945 Female White Non- English      Your appointments     Aug 25, 2017  3:40 PM   Established Patient with Arminda Chin M.D.   [address] ([address])    [address] [place] [address] [address] [place] [id number]   [phone]           You will be receiving a confirmation call a few days before your appointment from our automated call confirmation system.              Problem List              ICD-10-CM Priority Class Noted - Resolved    Diabetes mellitus type 2 in nonobese (CMS-HCC) E11.9   2015 - Present    Chronic low back pain M54.5, G89.29   2015 - " Present    Hyperlipidemia E78.5   6/19/2015 - Present    Hypothyroidism E03.9   6/19/2015 - Present    Tobacco dependence F17.200   1/9/2017 - Present    Septic shock (CMS-HCC) A41.9, R65.21 High  4/15/2017 - Present    Anemia, macrocytic D53.9   4/16/2017 - Present    Acute renal failure (ARF) (CMS-HCC) N17.9 Medium  4/16/2017 - Present    Transaminitis R74.0   4/16/2017 - Present    Encephalopathy acute G93.40   4/16/2017 - Present    UTI (urinary tract infection) N39.0 Medium  4/16/2017 - Present      Health Maintenance        Date Due Completion Dates    DIABETES MONOFILAMENT / LE EXAM 1945 ---    RETINAL SCREENING 3/25/1963 ---    URINE ACR / MICROALBUMIN 3/25/1963 ---    IMM DTaP/Tdap/Td Vaccine (1 - Tdap) 3/25/1964 ---    MAMMOGRAM 3/25/1985 ---    COLONOSCOPY 3/25/1995 ---    IMM ZOSTER VACCINE 3/25/2005 ---    BONE DENSITY 3/25/2010 ---    A1C SCREENING 7/9/2017 1/9/2017, 8/18/2016    FASTING LIPID PROFILE 8/18/2017 8/18/2016    IMM PNEUMOCOCCAL 65+ (ADULT) LOW/MEDIUM RISK SERIES (2 of 2 - PCV13) 1/9/2018 1/9/2017    SERUM CREATININE 4/19/2018 4/19/2017, 4/18/2017, 4/17/2017, 4/16/2017, 4/15/2017, 8/18/2016            Current Immunizations     Influenza Vaccine Adult HD 12/21/2016    Pneumococcal polysaccharide vaccine (PPSV-23) 1/9/2017      Below and/or attached are the medications your provider expects you to take. Review all of your home medications and newly ordered medications with your provider and/or pharmacist. Follow medication instructions as directed by your provider and/or pharmacist. Please keep your medication list with you and share with your provider. Update the information when medications are discontinued, doses are changed, or new medications (including over-the-counter products) are added; and carry medication information at all times in the event of emergency situations     Allergies:  IBUPROFEN - Rash     PENICILLINS - Rash               Medications  Valid as of: May 25, 2017 -   4:12 PM    Generic Name Brand Name Tablet Size Instructions for use    Citalopram Hydrobromide (Tab) CELEXA 20 MG Take 20 mg by mouth every day.        ClonazePAM (Tab) KLONOPIN 0.5 MG Take 0.5 mg by mouth 2 times a day.        DiazePAM (Tab) VALIUM 2 MG Take 2 mg by mouth every 6 hours as needed for Anxiety.        Doxepin HCl (Cap) SINEQUAN 50 MG Take 50 mg by mouth every evening.        DULoxetine HCl (Cap DR Particles) CYMBALTA 20 MG Take 20 mg by mouth every day.        Ferrous Sulfate (Tab) Ferrous Sulfate 28 MG Take  by mouth.        Gabapentin (Cap) NEURONTIN 100 MG Take 100-200 mg by mouth 2 Times a Day. RX states:  1 cap in AM and 2 cap in PM        LamoTRIgine (Tab) LAMICTAL 100 MG Take 1 Tab by mouth every day.        Levothyroxine Sodium (Tab) SYNTHROID 50 MCG Take 1 Tab by mouth every day.        Lisinopril (Tab) PRINIVIL 2.5 MG Take 2.5 mg by mouth every day.        Melatonin   Take 5 mg by mouth.        MetFORMIN HCl (Tab) GLUCOPHAGE 500 MG Take 500 mg by mouth 2 times a day, with meals.        Oxycodone-Acetaminophen (Tab) PERCOCET-10  MG Take 1 Tab by mouth 4 times a day.        Promethazine HCl (Tab) PHENERGAN 25 MG Take 25 mg by mouth every 4 hours.        Rosuvastatin Calcium (Tab) CRESTOR 20 MG Take 1 Tab by mouth every evening.        Sertraline HCl (Tab) ZOLOFT 50 MG Take 50 mg by mouth every day.        SUMAtriptan Succinate (Tab) IMITREX 25 MG Take  mg by mouth Once PRN for Migraine.        TraZODone HCl (Tab) DESYREL 50 MG Take 50 mg by mouth every evening.        .                 Medicines prescribed today were sent to:     [facility] #[id number] - [place], [place] - [address] [place] [address] [place] [address] [place] [address]    Phone: [phone] Fax: [phone]    Open 24 Hours?: No      Medication refill instructions:       If your prescription bottle indicates you have medication refills left, it is not necessary to call your provider’s office. Please  contact your pharmacy and they will refill your medication.    If your prescription bottle indicates you do not have any refills left, you may request refills at any time through one of the following ways: The online [employer] system (except Urgent Care), by calling your provider’s office, or by asking your pharmacy to contact your provider’s office with a refill request. Medication refills are processed only during regular business hours and may not be available until the next business day. Your provider may request additional information or to have a follow-up visit with you prior to refilling your medication.   *Please Note: Medication refills are assigned a new Rx number when refilled electronically. Your pharmacy may indicate that no refills were authorized even though a new prescription for the same medication is available at the pharmacy. Please request the medicine by name with the pharmacy before contacting your provider for a refill.        Your To Do List     Future Labs/Procedures Complete By Expires    COMP METABOLIC PANEL  As directed 5/25/2018         [employer] Access Code: Activation code not generated  Current [employer] Status: Active

## 2017-05-26 NOTE — PROGRESS NOTES
CC:Establish a new PCP    HPI:  Alie presents today to establish a new primary care relationship.    Patient has been the following medical issues:    Diabetes mellitus type 2 , has been adequately controlled. Last A1C was 6.4 in 1/2017. Asymptomatic. Has been on Metfromin 500 mg bid.    Essential hypertension, BP has been adequately controlled on current medication. Denies headache, chest pain, and SOB.Has been on Lisinopril 2.5 mg daily.No side effects.    Hyperlipidemia, she has been tolerating the statin. Denies muscle pain LFTs has been normal, has been on Crestor 20 mg daily.    Hypothyroidism, she has been tolerating Levothyroxine, no palpitation, no cold or heat intolerance. Last TSH was high in 4/2017 ( 9.920). She has been on levothyroxine 50 mcg daily.    Bipolar disorder with depression , patient has been currently stable, however she as h/o suicidal ideation. Has been taking a lot of antidepressant, and antianxiety medication,she has been follow up with psychiatry every 3-4 month, patient is counseled about not to take same group of medications like zoloft, and Celexa, and the Clonazepam, and the diazepam. However she has been following up with psychiatry.Will ask fr psych record. Patient is also counseled about pain medication and Benzos.    Chronic bilateral low back pain with sciatica, she has been on oxycodone and gabapentin. Patient is counseled about taking pain medication with benzos.She follows up with pain management.    Chronic kidney disease, stage 3 , she has had acute kidney injury when hospitalized in 4/2017 ( eGFR was 20), has improved eventually to ( 48), with normal Cr ( 1.1).Has been asymptomatic.    Has had elevated liver enzymes during her prior hospitalization, has has been gradually improving, however last levels of AST, and ALT was still high.Denies change in the color of her urine, stool, and the skin.    Will discuss all HM topics and vaccinations next visit.      Patient  Active Problem List    Diagnosis Date Noted   • Septic shock (CMS-HCC) 04/15/2017     Priority: High   • Acute renal failure (ARF) (CMS-HCC) 04/16/2017     Priority: Medium   • UTI (urinary tract infection) 04/16/2017     Priority: Medium   • Anemia, macrocytic 04/16/2017   • Transaminitis 04/16/2017   • Encephalopathy acute 04/16/2017   • Tobacco dependence 01/09/2017   • Diabetes mellitus type 2 in nonobese (CMS-HCC) 06/19/2015   • Chronic low back pain 06/19/2015   • Hyperlipidemia 06/19/2015   • Hypothyroidism 06/19/2015       Current Outpatient Prescriptions   Medication Sig Dispense Refill   • duloxetine (CYMBALTA) 20 MG Cap DR Particles Take 20 mg by mouth every day.     • Ferrous Sulfate 28 MG Tab Take  by mouth.     • trazodone (DESYREL) 50 MG Tab Take 50 mg by mouth every evening.     • MELATONIN PO Take 5 mg by mouth.     • SUMAtriptan (IMITREX) 25 MG Tab tablet Take  mg by mouth Once PRN for Migraine.     • doxepin (SINEQUAN) 50 MG Cap Take 50 mg by mouth every evening.     • diazepam (VALIUM) 2 MG Tab Take 2 mg by mouth every 6 hours as needed for Anxiety.     • sertraline (ZOLOFT) 50 MG Tab Take 50 mg by mouth every day.     • levothyroxine (SYNTHROID) 50 MCG Tab Take 1 Tab by mouth every day. 90 Tab 1   • citalopram (CELEXA) 20 MG Tab Take 20 mg by mouth every day.     • lisinopril (PRINIVIL) 2.5 MG Tab Take 2.5 mg by mouth every day.     • promethazine (PHENERGAN) 25 MG Tab Take 25 mg by mouth every 4 hours.     • gabapentin (NEURONTIN) 100 MG Cap Take 100-200 mg by mouth 2 Times a Day. RX states:  1 cap in AM and 2 cap in PM     • clonazepam (KLONOPIN) 0.5 MG Tab Take 0.5 mg by mouth 2 times a day.     • metformin (GLUCOPHAGE) 500 MG Tab Take 500 mg by mouth 2 times a day, with meals.     • rosuvastatin (CRESTOR) 20 MG Tab Take 1 Tab by mouth every evening. 90 Tab 1   • lamotrigine (LAMICTAL) 100 MG Tab Take 1 Tab by mouth every day. 90 Tab 1   • Oxycodone-Acetaminophen (PERCOCET-10)  MG  "TABS Take 1 Tab by mouth 4 times a day.       No current facility-administered medications for this visit.         Allergies as of 05/25/2017 - Elie as Reviewed 05/25/2017   Allergen Reaction Noted   • Ibuprofen Rash 04/15/2017   • Penicillins Rash 04/15/2017        Social History     Social History   • Marital Status:      Spouse Name: N/A   • Number of Children: N/A   • Years of Education: N/A     Occupational History   • Not on file.     Social History Main Topics   • Smoking status: Former Smoker -- 0.50 packs/day for 50 years     Types: Cigarettes     Quit date: 08/25/2016   • Smokeless tobacco: Never Used   • Alcohol Use: No   • Drug Use: No   • Sexual Activity: Not on file     Other Topics Concern   • Not on file     Social History Narrative       History reviewed. No pertinent family history.    Past Surgical History   Procedure Laterality Date   • Abdominal hysterectomy total     • Athroplasty     • Cholecystectomy         ROS:  Denies any Headache, Blurred Vision, Confusion Chest pain,  Shortness of breath,  Abdominal pain, Changes of bowel or bladder, Lower ext edema, Fevers, Nights sweats, Weight Changes, Focal weakness or numbness.  All other systems are negative.    /78 mmHg  Pulse 89  Temp(Src) 37.6 °C (99.7 °F)  Resp 14  Ht 1.544 m (5' 0.79\")  Wt 67.3 kg (148 lb 5.9 oz)  BMI 28.23 kg/m2  SpO2 95%    Physical Exam:  Gen:         Alert and oriented, No apparent distress.  HEENT:   Perrla, TM clear,  Oralpharynx no erythema or exudates.  Neck:       No Jugular venous distension, Lymphadenopathy, Thyromegaly, Bruits.  Lungs:     Clear to auscultation bilaterally  CV:          Regular rate and rhythm. No murmurs, rubs or gallops.  Abd:         Soft non tender, non distended. Normal active bowel sounds. No hepatosplenomegaly, No pulsatile masses.  Ext:          No clubbing, cyanosis, edema.      Assessment and Plan.   72 y.o. female     1. Diabetes mellitus type 2 in nonobese " (CMS-HCC)  Adequately controlled. Last A1C was 6.4 in 1/2017  Continue on Metfromin 500 mg bid.    2. Essential hypertension  Has been adequately controlled on current medication. Denies headache, chest pain, and SOB.  Continue on Lisinopril 2.5 mg daily.    3. Hyperlipidemia, unspecified hyperlipidemia type  He has been tolerating the statin. Denies muscle pain LFTs has been normal  Continue on Crestor 20 mg daily.    4. Hypothyroidism, unspecified type  He has been tolerating Levothyroxine, no palpitation, no cold or heat intolerance. Last TSH was high in 4/2017 ( 9.920)  Continue on levothyroxine 50 mcg daily.  Will repeat TSH/free T4.    - TSH+FREE T4    5. Bipolar disorder with depression (CMS-HCC)  Has been stable .  Patient stated she has been on all this medication, and she has been followup with psychiatry every 3-4 month, patient is counseled about taking thong group of medications like zoloft, and Celexa, and the Clonazepam, and the diazepam. However she has been following up with psychiatry.    6. Chronic bilateral low back pain with sciatica, sciatica laterality unspecified  Has been on oxycodone and gabapentin  Counseled about taking pain medication with benzos.  She follows up with pain management.    7. Health care maintenance  Will discuss all HM topics and vaccinations next visit.    8. CKD (chronic kidney disease), stage 3 (moderate)  Has had acute kidney injury when hospitalized in 4/2017 ( eGFR was 20), has improved eventually to ( 48), with normal Cr ( 1.1).  Will continue follow up.    - COMP METABOLIC PANEL; Future    9. Liver enzyme elevation  Has had elevated liver enzymes during her prior hospitalization, has has been gradually improving, however last levels of AST, and ALT was still high.  Will continue follow up.    - COMP METABOLIC PANEL; Future

## 2017-06-02 ENCOUNTER — HOSPITAL ENCOUNTER (OUTPATIENT)
Dept: LAB | Facility: MEDICAL CENTER | Age: 72
End: 2017-06-02
Attending: FAMILY MEDICINE
Payer: MEDICARE

## 2017-06-02 DIAGNOSIS — R74.8 LIVER ENZYME ELEVATION: ICD-10-CM

## 2017-06-02 DIAGNOSIS — N18.3 CKD (CHRONIC KIDNEY DISEASE), STAGE 3 (MODERATE): ICD-10-CM

## 2017-06-02 LAB
ALBUMIN SERPL BCP-MCNC: 4.7 G/DL (ref 3.2–4.9)
ALBUMIN/GLOB SERPL: 1.5 G/DL
ALP SERPL-CCNC: 55 U/L (ref 30–99)
ALT SERPL-CCNC: 13 U/L (ref 2–50)
ANION GAP SERPL CALC-SCNC: 11 MMOL/L (ref 0–11.9)
AST SERPL-CCNC: 19 U/L (ref 12–45)
BILIRUB SERPL-MCNC: 0.6 MG/DL (ref 0.1–1.5)
BUN SERPL-MCNC: 28 MG/DL (ref 8–22)
CALCIUM SERPL-MCNC: 9.9 MG/DL (ref 8.5–10.5)
CHLORIDE SERPL-SCNC: 97 MMOL/L (ref 96–112)
CO2 SERPL-SCNC: 28 MMOL/L (ref 20–33)
CREAT SERPL-MCNC: 1.2 MG/DL (ref 0.5–1.4)
GFR SERPL CREATININE-BSD FRML MDRD: 44 ML/MIN/1.73 M 2
GLOBULIN SER CALC-MCNC: 3.2 G/DL (ref 1.9–3.5)
GLUCOSE SERPL-MCNC: 87 MG/DL (ref 65–99)
POTASSIUM SERPL-SCNC: 3.8 MMOL/L (ref 3.6–5.5)
PROT SERPL-MCNC: 7.9 G/DL (ref 6–8.2)
SODIUM SERPL-SCNC: 136 MMOL/L (ref 135–145)
T4 FREE SERPL-MCNC: 0.71 NG/DL (ref 0.53–1.43)
TSH SERPL DL<=0.005 MIU/L-ACNC: 11.91 UIU/ML (ref 0.3–3.7)

## 2017-06-02 PROCEDURE — 80053 COMPREHEN METABOLIC PANEL: CPT

## 2017-06-02 PROCEDURE — 84443 ASSAY THYROID STIM HORMONE: CPT

## 2017-06-02 PROCEDURE — 36415 COLL VENOUS BLD VENIPUNCTURE: CPT

## 2017-06-02 PROCEDURE — 84439 ASSAY OF FREE THYROXINE: CPT

## 2017-06-05 DIAGNOSIS — E03.9 HYPOTHYROIDISM, UNSPECIFIED TYPE: ICD-10-CM

## 2017-06-05 RX ORDER — LEVOTHYROXINE SODIUM 0.07 MG/1
75 TABLET ORAL
Qty: 90 TAB | Refills: 1 | Status: SHIPPED | OUTPATIENT
Start: 2017-06-05

## 2017-06-07 ENCOUNTER — OFFICE VISIT (OUTPATIENT)
Dept: MEDICAL GROUP | Facility: MEDICAL CENTER | Age: 72
End: 2017-06-07
Payer: MEDICARE

## 2017-06-07 VITALS
TEMPERATURE: 97.7 F | HEART RATE: 98 BPM | WEIGHT: 139.55 LBS | HEIGHT: 61 IN | DIASTOLIC BLOOD PRESSURE: 70 MMHG | OXYGEN SATURATION: 95 % | BODY MASS INDEX: 26.35 KG/M2 | RESPIRATION RATE: 14 BRPM | SYSTOLIC BLOOD PRESSURE: 110 MMHG

## 2017-06-07 DIAGNOSIS — E03.9 HYPOTHYROIDISM, UNSPECIFIED TYPE: ICD-10-CM

## 2017-06-07 PROBLEM — R65.21 SEPTIC SHOCK (HCC): Status: RESOLVED | Noted: 2017-04-15 | Resolved: 2017-06-07

## 2017-06-07 PROBLEM — A41.9 SEPTIC SHOCK (HCC): Status: RESOLVED | Noted: 2017-04-15 | Resolved: 2017-06-07

## 2017-06-07 PROBLEM — N17.9 ACUTE RENAL FAILURE (ARF) (HCC): Status: RESOLVED | Noted: 2017-04-16 | Resolved: 2017-06-07

## 2017-06-07 PROBLEM — N39.0 UTI (URINARY TRACT INFECTION): Status: RESOLVED | Noted: 2017-04-16 | Resolved: 2017-06-07

## 2017-06-07 PROCEDURE — 0518F FALL PLAN OF CARE DOCD: CPT | Mod: 8P | Performed by: FAMILY MEDICINE

## 2017-06-07 PROCEDURE — 4040F PNEUMOC VAC/ADMIN/RCVD: CPT | Performed by: FAMILY MEDICINE

## 2017-06-07 PROCEDURE — 1036F TOBACCO NON-USER: CPT | Performed by: FAMILY MEDICINE

## 2017-06-07 PROCEDURE — 3017F COLORECTAL CA SCREEN DOC REV: CPT | Performed by: FAMILY MEDICINE

## 2017-06-07 PROCEDURE — 3014F SCREEN MAMMO DOC REV: CPT | Mod: 8P | Performed by: FAMILY MEDICINE

## 2017-06-07 PROCEDURE — 3288F FALL RISK ASSESSMENT DOCD: CPT | Mod: 8P | Performed by: FAMILY MEDICINE

## 2017-06-07 PROCEDURE — 99214 OFFICE O/P EST MOD 30 MIN: CPT | Performed by: FAMILY MEDICINE

## 2017-06-07 PROCEDURE — 1100F PTFALLS ASSESS-DOCD GE2>/YR: CPT | Performed by: FAMILY MEDICINE

## 2017-06-07 PROCEDURE — G8432 DEP SCR NOT DOC, RNG: HCPCS | Performed by: FAMILY MEDICINE

## 2017-06-07 PROCEDURE — G8417 CALC BMI ABV UP PARAM F/U: HCPCS | Performed by: FAMILY MEDICINE

## 2017-06-07 RX ORDER — TIZANIDINE HYDROCHLORIDE 4 MG/1
4 CAPSULE, GELATIN COATED ORAL 3 TIMES DAILY
COMMUNITY
End: 2017-06-07

## 2017-06-07 RX ORDER — TIZANIDINE HYDROCHLORIDE 4 MG/1
4 CAPSULE, GELATIN COATED ORAL 3 TIMES DAILY
Qty: 90 CAP | Status: CANCELLED | OUTPATIENT
Start: 2017-06-07

## 2017-06-07 RX ORDER — PROMETHAZINE HYDROCHLORIDE 25 MG/1
25 TABLET ORAL EVERY 4 HOURS
Qty: 30 TAB | Status: CANCELLED | OUTPATIENT
Start: 2017-06-07

## 2017-06-07 NOTE — PROGRESS NOTES
CC: To discuss hr uncontrolled thyroid function    HPI:   Alie presents today to discuss her abnormal TSH. Patient has h/o hypothyroidism, she has been on levothyroxine 50 mcg , however her last TSH was low 11.910. Denies palpitation, cold or heat intolerance.Denies change in her weight.        Patient Active Problem List    Diagnosis Date Noted   • Anemia, macrocytic 04/16/2017   • Transaminitis 04/16/2017   • Encephalopathy acute 04/16/2017   • Tobacco dependence 01/09/2017   • Diabetes mellitus type 2 in nonobese (CMS-HCC) 06/19/2015   • Chronic low back pain 06/19/2015   • Hyperlipidemia 06/19/2015   • Hypothyroidism 06/19/2015       Current Outpatient Prescriptions   Medication Sig Dispense Refill   • levothyroxine (SYNTHROID) 75 MCG Tab Take 1 Tab by mouth Every morning on an empty stomach. 90 Tab 1   • duloxetine (CYMBALTA) 20 MG Cap DR Particles Take 20 mg by mouth every day.     • Ferrous Sulfate 28 MG Tab Take  by mouth.     • trazodone (DESYREL) 50 MG Tab Take 50 mg by mouth every evening.     • MELATONIN PO Take 5 mg by mouth.     • SUMAtriptan (IMITREX) 25 MG Tab tablet Take  mg by mouth Once PRN for Migraine.     • doxepin (SINEQUAN) 50 MG Cap Take 50 mg by mouth every evening.     • sertraline (ZOLOFT) 50 MG Tab Take 50 mg by mouth every day.     • citalopram (CELEXA) 20 MG Tab Take 20 mg by mouth every day.     • lisinopril (PRINIVIL) 2.5 MG Tab Take 2.5 mg by mouth every day.     • gabapentin (NEURONTIN) 100 MG Cap Take 100-200 mg by mouth 2 Times a Day. RX states:  1 cap in AM and 2 cap in PM     • clonazepam (KLONOPIN) 0.5 MG Tab Take 0.5 mg by mouth 2 times a day.     • metformin (GLUCOPHAGE) 500 MG Tab Take 500 mg by mouth 2 times a day, with meals.     • rosuvastatin (CRESTOR) 20 MG Tab Take 1 Tab by mouth every evening. 90 Tab 1   • lamotrigine (LAMICTAL) 100 MG Tab Take 1 Tab by mouth every day. 90 Tab 1   • Oxycodone-Acetaminophen (PERCOCET-10)  MG TABS Take 1 Tab by mouth 4  "times a day.       No current facility-administered medications for this visit.         Allergies as of 06/07/2017 - Elie as Reviewed 06/07/2017   Allergen Reaction Noted   • Ibuprofen Rash 04/15/2017   • Penicillins Rash 04/15/2017        ROS: Denies any chest pain, Shortness of breath, Changes bowel or bladder, Lower extremity edema.    Physical Exam:  /70 mmHg  Pulse 98  Temp(Src) 36.5 °C (97.7 °F)  Resp 14  Ht 1.549 m (5' 0.98\")  Wt 63.3 kg (139 lb 8.8 oz)  BMI 26.38 kg/m2  SpO2 95%  Gen.: Well-developed, well-nourished, no apparent distress,pleasant and cooperative with the examination  Skin:  Warm and dry with good turgor. No rashes or suspicious lesions in visible areas  Cor: Regular rate and rhythm without murmur, gallop or rub.  Lungs: Respirations unlabored.Clear to auscultation with equal breath sounds bilaterally. No wheezes, rhonchi.        Assessment and Plan.   72 y.o. female     1. Hypothyroidism, unspecified type  Last TSH was 11.910.  She was on levothyroxine 50 mcg daily.  Will increase it to 75 mcg daily.  Repeat TSH/free T 4 in 6 to 8 weeks.    - TSH+FREE T4        "

## 2017-06-07 NOTE — MR AVS SNAPSHOT
"        Alie Morris   2017 2:40 PM   Office Visit   MRN: [id number]    Department:  [address] [place] [address]   Dept Phone:  [phone]    Description:  Female : 1945   Provider:  Arminda Chin M.D.           Reason for Visit     Other states had a lump on her right calf that has moved up her leg and into her back. Doesn't know what it is.      Allergies as of 2017     Allergen Noted Reactions    Ibuprofen 04/15/2017   Rash    Rash    Penicillins 04/15/2017   Rash    Rash  Tolerated Zosyn 4/15/2017      You were diagnosed with     Hypothyroidism, unspecified type   [[id number]]         Vital Signs     Blood Pressure Pulse Temperature Respirations Height Weight    110/70 mmHg 98 36.5 °C (97.7 °F) 14 1.549 m (5' 0.98\") 63.3 kg (139 lb 8.8 oz)    Body Mass Index Oxygen Saturation Smoking Status             26.38 kg/m2 95% Former Smoker         Basic Information     Date Of Birth Sex Race Ethnicity Preferred Language    1945 Female White Non- English      Your appointments     Aug 07, 2017 10:20 AM   Established Patient with Arminda Chin M.D.   [facility] [address]    [address] [id number] [phone]           You will be receiving a confirmation call a few days before your appointment from our automated call confirmation system.            Aug 25, 2017  3:40 PM   Established Patient with Arminda Chin M.D.   [facility] [address]    [address]  [address] [id number] [phone]           You will be receiving a confirmation call a few days before your appointment from our automated call confirmation system.              Problem List              ICD-10-CM Priority Class Noted - Resolved    Diabetes mellitus type 2 in nonobese (CMS-HCC) E11.9   2015 - Present    Chronic low back pain M54.5, G89.29   2015 - Present    Hyperlipidemia E78.5   2015 - Present   " Hypothyroidism E03.9   6/19/2015 - Present    Tobacco dependence F17.200   1/9/2017 - Present    Anemia, macrocytic D53.9   4/16/2017 - Present    Transaminitis R74.0   4/16/2017 - Present    Encephalopathy acute G93.40   4/16/2017 - Present      Health Maintenance        Date Due Completion Dates    DIABETES MONOFILAMENT / LE EXAM 1945 ---    RETINAL SCREENING 3/25/1963 ---    URINE ACR / MICROALBUMIN 3/25/1963 ---    IMM DTaP/Tdap/Td Vaccine (1 - Tdap) 3/25/1964 ---    MAMMOGRAM 3/25/1985 ---    COLONOSCOPY 3/25/1995 ---    IMM ZOSTER VACCINE 3/25/2005 ---    BONE DENSITY 3/25/2010 ---    A1C SCREENING 7/9/2017 1/9/2017, 8/18/2016    FASTING LIPID PROFILE 8/18/2017 8/18/2016    IMM PNEUMOCOCCAL 65+ (ADULT) LOW/MEDIUM RISK SERIES (2 of 2 - PCV13) 1/9/2018 1/9/2017    SERUM CREATININE 6/2/2018 6/2/2017, 4/19/2017, 4/18/2017, 4/17/2017, 4/16/2017, 4/15/2017, 8/18/2016            Current Immunizations     Influenza Vaccine Adult HD 12/21/2016    Pneumococcal polysaccharide vaccine (PPSV-23) 1/9/2017      Below and/or attached are the medications your provider expects you to take. Review all of your home medications and newly ordered medications with your provider and/or pharmacist. Follow medication instructions as directed by your provider and/or pharmacist. Please keep your medication list with you and share with your provider. Update the information when medications are discontinued, doses are changed, or new medications (including over-the-counter products) are added; and carry medication information at all times in the event of emergency situations     Allergies:  IBUPROFEN - Rash     PENICILLINS - Rash               Medications  Valid as of: June 07, 2017 -  2:57 PM    Generic Name Brand Name Tablet Size Instructions for use    Citalopram Hydrobromide (Tab) CELEXA 20 MG Take 20 mg by mouth every day.        ClonazePAM (Tab) KLONOPIN 0.5 MG Take 0.5 mg by mouth 2 times a day.        Doxepin HCl (Cap)  SINEQUAN 50 MG Take 50 mg by mouth every evening.        DULoxetine HCl (Cap DR Particles) CYMBALTA 20 MG Take 20 mg by mouth every day.        Ferrous Sulfate (Tab) Ferrous Sulfate 28 MG Take  by mouth.        Gabapentin (Cap) NEURONTIN 100 MG Take 100-200 mg by mouth 2 Times a Day. RX states:  1 cap in AM and 2 cap in PM        LamoTRIgine (Tab) LAMICTAL 100 MG Take 1 Tab by mouth every day.        Levothyroxine Sodium (Tab) SYNTHROID 75 MCG Take 1 Tab by mouth Every morning on an empty stomach.        Lisinopril (Tab) PRINIVIL 2.5 MG Take 2.5 mg by mouth every day.        Melatonin   Take 5 mg by mouth.        MetFORMIN HCl (Tab) GLUCOPHAGE 500 MG Take 500 mg by mouth 2 times a day, with meals.        Oxycodone-Acetaminophen (Tab) PERCOCET-10  MG Take 1 Tab by mouth 4 times a day.        Rosuvastatin Calcium (Tab) CRESTOR 20 MG Take 1 Tab by mouth every evening.        Sertraline HCl (Tab) ZOLOFT 50 MG Take 50 mg by mouth every day.        SUMAtriptan Succinate (Tab) IMITREX 25 MG Take  mg by mouth Once PRN for Migraine.        TraZODone HCl (Tab) DESYREL 50 MG Take 50 mg by mouth every evening.        .                 Medicines prescribed today were sent to:     [facility] #[id number] - [place], [place] - [address] [place] [id number]    Phone: [phone] Fax: [phone]    Open 24 Hours?: No      Medication refill instructions:       If your prescription bottle indicates you have medication refills left, it is not necessary to call your provider’s office. Please contact your pharmacy and they will refill your medication.    If your prescription bottle indicates you do not have any refills left, you may request refills at any time through one of the following ways: The online [employer] system (except Urgent Care), by calling your provider’s office, or by asking your pharmacy to contact your provider’s office with a refill request. Medication refills  are processed only during regular business hours and may not be available until the next business day. Your provider may request additional information or to have a follow-up visit with you prior to refilling your medication.   *Please Note: Medication refills are assigned a new Rx number when refilled electronically. Your pharmacy may indicate that no refills were authorized even though a new prescription for the same medication is available at the pharmacy. Please request the medicine by name with the pharmacy before contacting your provider for a refill.           [facility] Access Code: Activation code not generated  Current [facility] Status: Active

## 2017-08-07 ENCOUNTER — APPOINTMENT (OUTPATIENT)
Dept: MEDICAL GROUP | Facility: MEDICAL CENTER | Age: 72
End: 2017-08-07
Payer: MEDICARE

## 2017-08-25 ENCOUNTER — APPOINTMENT (OUTPATIENT)
Dept: MEDICAL GROUP | Facility: MEDICAL CENTER | Age: 72
End: 2017-08-25
Payer: MEDICARE

## 2017-09-28 RX ORDER — ROSUVASTATIN CALCIUM 20 MG/1
TABLET, COATED ORAL
Qty: 90 TAB | Refills: 1 | Status: SHIPPED | OUTPATIENT
Start: 2017-09-28

## 2017-10-02 RX ORDER — ROSUVASTATIN CALCIUM 20 MG/1
TABLET, COATED ORAL
Qty: 90 TAB | Refills: 1 | Status: SHIPPED | OUTPATIENT
Start: 2017-10-02

## 2017-10-06 NOTE — TELEPHONE ENCOUNTER
Was the patient seen in the last year in this department? Yes    Does patient have an active prescription for medications requested? No    Received Request Via: Pharmacy

## 2017-10-08 RX ORDER — HYDROCHLOROTHIAZIDE 25 MG/1
25 TABLET ORAL DAILY
Qty: 90 TAB | Refills: 0 | Status: SHIPPED | OUTPATIENT
Start: 2017-10-08

## 2017-10-12 RX ORDER — LAMOTRIGINE 100 MG/1
100 TABLET ORAL DAILY
Qty: 90 TAB | Refills: 3 | Status: SHIPPED | OUTPATIENT
Start: 2017-10-12

## 2017-10-12 RX ORDER — LAMOTRIGINE 100 MG/1
TABLET ORAL
Refills: 0 | OUTPATIENT
Start: 2017-10-12

## 2021-01-14 DIAGNOSIS — Z23 NEED FOR VACCINATION: ICD-10-CM
